# Patient Record
Sex: FEMALE | Race: WHITE | NOT HISPANIC OR LATINO | Employment: FULL TIME | URBAN - METROPOLITAN AREA
[De-identification: names, ages, dates, MRNs, and addresses within clinical notes are randomized per-mention and may not be internally consistent; named-entity substitution may affect disease eponyms.]

---

## 2020-08-30 ENCOUNTER — HOSPITAL ENCOUNTER (INPATIENT)
Facility: HOSPITAL | Age: 30
LOS: 3 days | Discharge: HOME | End: 2020-09-02
Attending: EMERGENCY MEDICINE | Admitting: INTERNAL MEDICINE

## 2020-08-30 ENCOUNTER — APPOINTMENT (OUTPATIENT)
Dept: CT IMAGING | Facility: HOSPITAL | Age: 30
End: 2020-08-30

## 2020-08-30 ENCOUNTER — APPOINTMENT (OUTPATIENT)
Dept: GENERAL RADIOLOGY | Facility: HOSPITAL | Age: 30
End: 2020-08-30

## 2020-08-30 DIAGNOSIS — R10.84 GENERALIZED ABDOMINAL PAIN: Primary | ICD-10-CM

## 2020-08-30 DIAGNOSIS — K72.90 LIVER FAILURE WITHOUT HEPATIC COMA, UNSPECIFIED CHRONICITY (HCC): ICD-10-CM

## 2020-08-30 DIAGNOSIS — E83.119 HEMOCHROMATOSIS, UNSPECIFIED HEMOCHROMATOSIS TYPE: ICD-10-CM

## 2020-08-30 DIAGNOSIS — R18.8 OTHER ASCITES: ICD-10-CM

## 2020-08-30 PROBLEM — K74.60 CIRRHOSIS OF LIVER: Status: ACTIVE | Noted: 2020-08-30

## 2020-08-30 PROBLEM — F41.9 ANXIETY AND DEPRESSION: Status: ACTIVE | Noted: 2020-08-30

## 2020-08-30 PROBLEM — R50.9 FEVER: Status: ACTIVE | Noted: 2020-08-30

## 2020-08-30 PROBLEM — D72.829 LEUKOCYTOSIS: Status: ACTIVE | Noted: 2020-08-30

## 2020-08-30 PROBLEM — E87.6 HYPOKALEMIA: Status: ACTIVE | Noted: 2020-08-30

## 2020-08-30 PROBLEM — F32.A ANXIETY AND DEPRESSION: Status: ACTIVE | Noted: 2020-08-30

## 2020-08-30 PROBLEM — E87.1 HYPONATREMIA: Status: ACTIVE | Noted: 2020-08-30

## 2020-08-30 PROBLEM — D63.8 ANEMIA, CHRONIC DISEASE: Status: ACTIVE | Noted: 2020-08-30

## 2020-08-30 LAB
ALBUMIN SERPL-MCNC: 3.7 G/DL (ref 3.5–5.2)
ALBUMIN/GLOB SERPL: 1 G/DL
ALP SERPL-CCNC: 184 U/L (ref 39–117)
ALT SERPL W P-5'-P-CCNC: 53 U/L (ref 1–33)
ANION GAP SERPL CALCULATED.3IONS-SCNC: 15.8 MMOL/L (ref 5–15)
ANISOCYTOSIS BLD QL: ABNORMAL
APTT PPP: 36.5 SECONDS (ref 22.7–35.4)
AST SERPL-CCNC: 99 U/L (ref 1–32)
BILIRUB SERPL-MCNC: 5.8 MG/DL (ref 0–1.2)
BILIRUB UR QL STRIP: NEGATIVE
BUN SERPL-MCNC: 7 MG/DL (ref 6–20)
BUN/CREAT SERPL: 11.9 (ref 7–25)
CALCIUM SPEC-SCNC: 9.1 MG/DL (ref 8.6–10.5)
CHLORIDE SERPL-SCNC: 94 MMOL/L (ref 98–107)
CLARITY UR: CLEAR
CO2 SERPL-SCNC: 22.2 MMOL/L (ref 22–29)
COLOR UR: ABNORMAL
CREAT SERPL-MCNC: 0.59 MG/DL (ref 0.57–1)
D-LACTATE SERPL-SCNC: 2.4 MMOL/L (ref 0.5–2)
D-LACTATE SERPL-SCNC: 3 MMOL/L (ref 0.5–2)
DEPRECATED RDW RBC AUTO: 54.6 FL (ref 37–54)
ERYTHROCYTE [DISTWIDTH] IN BLOOD BY AUTOMATED COUNT: 13.1 % (ref 12.3–15.4)
ETHANOL BLD-MCNC: <10 MG/DL (ref 0–10)
ETHANOL UR QL: <0.01 %
FERRITIN SERPL-MCNC: 939 NG/ML (ref 13–150)
FOLATE SERPL-MCNC: 18.8 NG/ML (ref 4.78–24.2)
GFR SERPL CREATININE-BSD FRML MDRD: 118 ML/MIN/1.73
GLOBULIN UR ELPH-MCNC: 3.8 GM/DL
GLUCOSE SERPL-MCNC: 122 MG/DL (ref 65–99)
GLUCOSE UR STRIP-MCNC: NEGATIVE MG/DL
HBA1C MFR BLD: <4.3 % (ref 4.8–5.6)
HCG SERPL QL: NEGATIVE
HCT VFR BLD AUTO: 31.4 % (ref 34–46.6)
HGB BLD-MCNC: 11.1 G/DL (ref 12–15.9)
HGB UR QL STRIP.AUTO: NEGATIVE
HYPOCHROMIA BLD QL: ABNORMAL
INR PPP: 1.3 (ref 0.9–1.1)
IRON 24H UR-MRATE: 45 MCG/DL (ref 37–145)
IRON SATN MFR SERPL: 26 % (ref 20–50)
KETONES UR QL STRIP: NEGATIVE
LACTATE HOLD SPECIMEN: NORMAL
LDH SERPL-CCNC: 402 U/L (ref 135–214)
LEUKOCYTE ESTERASE UR QL STRIP.AUTO: NEGATIVE
LIPASE SERPL-CCNC: 51 U/L (ref 13–60)
LYMPHOCYTES # BLD MANUAL: 2.07 10*3/MM3 (ref 0.7–3.1)
LYMPHOCYTES NFR BLD MANUAL: 17.5 % (ref 19.6–45.3)
LYMPHOCYTES NFR BLD MANUAL: 6.2 % (ref 5–12)
MACROCYTES BLD QL SMEAR: ABNORMAL
MAGNESIUM SERPL-MCNC: 1.7 MG/DL (ref 1.6–2.6)
MCH RBC QN AUTO: 39.9 PG (ref 26.6–33)
MCHC RBC AUTO-ENTMCNC: 35.4 G/DL (ref 31.5–35.7)
MCV RBC AUTO: 112.9 FL (ref 79–97)
MONOCYTES # BLD AUTO: 0.73 10*3/MM3 (ref 0.1–0.9)
NEUTROPHILS # BLD AUTO: 9.04 10*3/MM3 (ref 1.7–7)
NEUTROPHILS NFR BLD MANUAL: 76.3 % (ref 42.7–76)
NITRITE UR QL STRIP: NEGATIVE
PH UR STRIP.AUTO: 5.5 [PH] (ref 5–8)
PLAT MORPH BLD: NORMAL
PLATELET # BLD AUTO: 317 10*3/MM3 (ref 140–450)
PMV BLD AUTO: 8.9 FL (ref 6–12)
POTASSIUM SERPL-SCNC: 3.1 MMOL/L (ref 3.5–5.2)
PROCALCITONIN SERPL-MCNC: 0.46 NG/ML (ref 0–0.25)
PROT SERPL-MCNC: 7.5 G/DL (ref 6–8.5)
PROT UR QL STRIP: NEGATIVE
PROTHROMBIN TIME: 16 SECONDS (ref 11.7–14.2)
RBC # BLD AUTO: 2.78 10*6/MM3 (ref 3.77–5.28)
RETICS # AUTO: 0.15 10*6/MM3 (ref 0.02–0.13)
RETICS/RBC NFR AUTO: 6.24 % (ref 0.7–1.9)
SODIUM SERPL-SCNC: 132 MMOL/L (ref 136–145)
SP GR UR STRIP: 1.01 (ref 1–1.03)
TARGETS BLD QL SMEAR: ABNORMAL
TIBC SERPL-MCNC: 170 MCG/DL (ref 298–536)
TRANSFERRIN SERPL-MCNC: 114 MG/DL (ref 200–360)
UROBILINOGEN UR QL STRIP: ABNORMAL
VIT B12 BLD-MCNC: 1404 PG/ML (ref 211–946)
WBC # BLD AUTO: 11.85 10*3/MM3 (ref 3.4–10.8)
WBC MORPH BLD: NORMAL

## 2020-08-30 PROCEDURE — 84466 ASSAY OF TRANSFERRIN: CPT | Performed by: INTERNAL MEDICINE

## 2020-08-30 PROCEDURE — 71045 X-RAY EXAM CHEST 1 VIEW: CPT

## 2020-08-30 PROCEDURE — 25010000002 IOPAMIDOL 61 % SOLUTION: Performed by: EMERGENCY MEDICINE

## 2020-08-30 PROCEDURE — 82607 VITAMIN B-12: CPT | Performed by: INTERNAL MEDICINE

## 2020-08-30 PROCEDURE — 83690 ASSAY OF LIPASE: CPT | Performed by: EMERGENCY MEDICINE

## 2020-08-30 PROCEDURE — 80053 COMPREHEN METABOLIC PANEL: CPT | Performed by: EMERGENCY MEDICINE

## 2020-08-30 PROCEDURE — 25010000002 ONDANSETRON PER 1 MG: Performed by: EMERGENCY MEDICINE

## 2020-08-30 PROCEDURE — 81003 URINALYSIS AUTO W/O SCOPE: CPT | Performed by: EMERGENCY MEDICINE

## 2020-08-30 PROCEDURE — 93005 ELECTROCARDIOGRAM TRACING: CPT | Performed by: EMERGENCY MEDICINE

## 2020-08-30 PROCEDURE — 99285 EMERGENCY DEPT VISIT HI MDM: CPT

## 2020-08-30 PROCEDURE — 87040 BLOOD CULTURE FOR BACTERIA: CPT | Performed by: EMERGENCY MEDICINE

## 2020-08-30 PROCEDURE — 25010000002 FUROSEMIDE PER 20 MG: Performed by: INTERNAL MEDICINE

## 2020-08-30 PROCEDURE — 83735 ASSAY OF MAGNESIUM: CPT | Performed by: EMERGENCY MEDICINE

## 2020-08-30 PROCEDURE — 83615 LACTATE (LD) (LDH) ENZYME: CPT | Performed by: INTERNAL MEDICINE

## 2020-08-30 PROCEDURE — 82746 ASSAY OF FOLIC ACID SERUM: CPT | Performed by: INTERNAL MEDICINE

## 2020-08-30 PROCEDURE — 84145 PROCALCITONIN (PCT): CPT | Performed by: EMERGENCY MEDICINE

## 2020-08-30 PROCEDURE — 0W9G3ZZ DRAINAGE OF PERITONEAL CAVITY, PERCUTANEOUS APPROACH: ICD-10-PCS | Performed by: RADIOLOGY

## 2020-08-30 PROCEDURE — 85045 AUTOMATED RETICULOCYTE COUNT: CPT | Performed by: INTERNAL MEDICINE

## 2020-08-30 PROCEDURE — 85007 BL SMEAR W/DIFF WBC COUNT: CPT | Performed by: EMERGENCY MEDICINE

## 2020-08-30 PROCEDURE — 85610 PROTHROMBIN TIME: CPT | Performed by: EMERGENCY MEDICINE

## 2020-08-30 PROCEDURE — 25010000002 CEFTRIAXONE PER 250 MG: Performed by: EMERGENCY MEDICINE

## 2020-08-30 PROCEDURE — U0004 COV-19 TEST NON-CDC HGH THRU: HCPCS | Performed by: EMERGENCY MEDICINE

## 2020-08-30 PROCEDURE — 83540 ASSAY OF IRON: CPT | Performed by: INTERNAL MEDICINE

## 2020-08-30 PROCEDURE — 85730 THROMBOPLASTIN TIME PARTIAL: CPT | Performed by: EMERGENCY MEDICINE

## 2020-08-30 PROCEDURE — 84703 CHORIONIC GONADOTROPIN ASSAY: CPT | Performed by: EMERGENCY MEDICINE

## 2020-08-30 PROCEDURE — 83605 ASSAY OF LACTIC ACID: CPT | Performed by: EMERGENCY MEDICINE

## 2020-08-30 PROCEDURE — 93010 ELECTROCARDIOGRAM REPORT: CPT | Performed by: INTERNAL MEDICINE

## 2020-08-30 PROCEDURE — 85025 COMPLETE CBC W/AUTO DIFF WBC: CPT | Performed by: EMERGENCY MEDICINE

## 2020-08-30 PROCEDURE — 80307 DRUG TEST PRSMV CHEM ANLYZR: CPT | Performed by: EMERGENCY MEDICINE

## 2020-08-30 PROCEDURE — 25010000002 HYDROMORPHONE 1 MG/ML SOLUTION: Performed by: INTERNAL MEDICINE

## 2020-08-30 PROCEDURE — 25010000002 FENTANYL CITRATE (PF) 100 MCG/2ML SOLUTION: Performed by: EMERGENCY MEDICINE

## 2020-08-30 PROCEDURE — 36415 COLL VENOUS BLD VENIPUNCTURE: CPT | Performed by: INTERNAL MEDICINE

## 2020-08-30 PROCEDURE — 82728 ASSAY OF FERRITIN: CPT | Performed by: INTERNAL MEDICINE

## 2020-08-30 PROCEDURE — 74177 CT ABD & PELVIS W/CONTRAST: CPT

## 2020-08-30 PROCEDURE — 83036 HEMOGLOBIN GLYCOSYLATED A1C: CPT | Performed by: INTERNAL MEDICINE

## 2020-08-30 RX ORDER — DIPHENHYDRAMINE HCL 25 MG
25 CAPSULE ORAL EVERY 6 HOURS PRN
Status: DISCONTINUED | OUTPATIENT
Start: 2020-08-30 | End: 2020-09-02 | Stop reason: HOSPADM

## 2020-08-30 RX ORDER — ONDANSETRON 2 MG/ML
4 INJECTION INTRAMUSCULAR; INTRAVENOUS EVERY 6 HOURS PRN
Status: DISCONTINUED | OUTPATIENT
Start: 2020-08-30 | End: 2020-09-02 | Stop reason: HOSPADM

## 2020-08-30 RX ORDER — CHOLECALCIFEROL (VITAMIN D3) 125 MCG
5 CAPSULE ORAL
COMMUNITY

## 2020-08-30 RX ORDER — SODIUM CHLORIDE 0.9 % (FLUSH) 0.9 %
10 SYRINGE (ML) INJECTION EVERY 12 HOURS SCHEDULED
Status: DISCONTINUED | OUTPATIENT
Start: 2020-08-30 | End: 2020-09-02 | Stop reason: HOSPADM

## 2020-08-30 RX ORDER — FUROSEMIDE 10 MG/ML
40 INJECTION INTRAMUSCULAR; INTRAVENOUS 2 TIMES DAILY
Status: DISCONTINUED | OUTPATIENT
Start: 2020-08-30 | End: 2020-08-31

## 2020-08-30 RX ORDER — FENTANYL CITRATE 50 UG/ML
50 INJECTION, SOLUTION INTRAMUSCULAR; INTRAVENOUS ONCE
Status: COMPLETED | OUTPATIENT
Start: 2020-08-30 | End: 2020-08-30

## 2020-08-30 RX ORDER — FUROSEMIDE 40 MG/1
40 TABLET ORAL DAILY
COMMUNITY

## 2020-08-30 RX ORDER — SPIRONOLACTONE 100 MG/1
100 TABLET, FILM COATED ORAL DAILY
COMMUNITY

## 2020-08-30 RX ORDER — HYDROCODONE BITARTRATE AND ACETAMINOPHEN 5; 325 MG/1; MG/1
1 TABLET ORAL EVERY 4 HOURS PRN
Status: DISCONTINUED | OUTPATIENT
Start: 2020-08-30 | End: 2020-09-02 | Stop reason: HOSPADM

## 2020-08-30 RX ORDER — BUSPIRONE HYDROCHLORIDE 5 MG/1
5 TABLET ORAL 3 TIMES DAILY
COMMUNITY

## 2020-08-30 RX ORDER — PANTOPRAZOLE SODIUM 40 MG/1
40 TABLET, DELAYED RELEASE ORAL DAILY
COMMUNITY

## 2020-08-30 RX ORDER — CYCLOBENZAPRINE HCL 10 MG
10 TABLET ORAL 3 TIMES DAILY PRN
COMMUNITY

## 2020-08-30 RX ORDER — CHOLECALCIFEROL (VITAMIN D3) 125 MCG
5 CAPSULE ORAL NIGHTLY PRN
Status: DISCONTINUED | OUTPATIENT
Start: 2020-08-30 | End: 2020-09-02 | Stop reason: HOSPADM

## 2020-08-30 RX ORDER — PANTOPRAZOLE SODIUM 40 MG/1
40 TABLET, DELAYED RELEASE ORAL EVERY MORNING
Status: DISCONTINUED | OUTPATIENT
Start: 2020-08-31 | End: 2020-09-02 | Stop reason: HOSPADM

## 2020-08-30 RX ORDER — BUSPIRONE HYDROCHLORIDE 5 MG/1
5 TABLET ORAL 3 TIMES DAILY
Status: DISCONTINUED | OUTPATIENT
Start: 2020-08-30 | End: 2020-09-02 | Stop reason: HOSPADM

## 2020-08-30 RX ORDER — NITROGLYCERIN 0.4 MG/1
0.4 TABLET SUBLINGUAL
Status: DISCONTINUED | OUTPATIENT
Start: 2020-08-30 | End: 2020-09-02 | Stop reason: HOSPADM

## 2020-08-30 RX ORDER — PAROXETINE 10 MG/1
10 TABLET, FILM COATED ORAL EVERY MORNING
COMMUNITY

## 2020-08-30 RX ORDER — PAROXETINE 10 MG/1
10 TABLET, FILM COATED ORAL EVERY MORNING
Status: DISCONTINUED | OUTPATIENT
Start: 2020-08-31 | End: 2020-09-02 | Stop reason: HOSPADM

## 2020-08-30 RX ORDER — CEFTRIAXONE SODIUM 1 G/50ML
1 INJECTION, SOLUTION INTRAVENOUS ONCE
Status: COMPLETED | OUTPATIENT
Start: 2020-08-30 | End: 2020-08-30

## 2020-08-30 RX ORDER — FENTANYL CITRATE 50 UG/ML
50 INJECTION, SOLUTION INTRAMUSCULAR; INTRAVENOUS
Status: DISCONTINUED | OUTPATIENT
Start: 2020-08-30 | End: 2020-08-30

## 2020-08-30 RX ORDER — POTASSIUM CHLORIDE 750 MG/1
20 CAPSULE, EXTENDED RELEASE ORAL 2 TIMES DAILY WITH MEALS
Status: DISCONTINUED | OUTPATIENT
Start: 2020-08-30 | End: 2020-09-01

## 2020-08-30 RX ORDER — FOLIC ACID 1 MG/1
1 TABLET ORAL DAILY
Status: DISCONTINUED | OUTPATIENT
Start: 2020-08-31 | End: 2020-09-02 | Stop reason: HOSPADM

## 2020-08-30 RX ORDER — ONDANSETRON 4 MG/1
4 TABLET, FILM COATED ORAL EVERY 6 HOURS PRN
Status: DISCONTINUED | OUTPATIENT
Start: 2020-08-30 | End: 2020-09-02 | Stop reason: HOSPADM

## 2020-08-30 RX ORDER — FOLIC ACID 1 MG/1
1 TABLET ORAL DAILY
COMMUNITY

## 2020-08-30 RX ORDER — ONDANSETRON 2 MG/ML
4 INJECTION INTRAMUSCULAR; INTRAVENOUS ONCE
Status: COMPLETED | OUTPATIENT
Start: 2020-08-30 | End: 2020-08-30

## 2020-08-30 RX ORDER — PENTOXIFYLLINE 400 MG/1
400 TABLET, EXTENDED RELEASE ORAL
COMMUNITY

## 2020-08-30 RX ORDER — SODIUM CHLORIDE 0.9 % (FLUSH) 0.9 %
10 SYRINGE (ML) INJECTION AS NEEDED
Status: DISCONTINUED | OUTPATIENT
Start: 2020-08-30 | End: 2020-09-02 | Stop reason: HOSPADM

## 2020-08-30 RX ORDER — CYCLOBENZAPRINE HCL 10 MG
10 TABLET ORAL 3 TIMES DAILY PRN
Status: DISCONTINUED | OUTPATIENT
Start: 2020-08-30 | End: 2020-09-02 | Stop reason: HOSPADM

## 2020-08-30 RX ORDER — BISACODYL 5 MG/1
5 TABLET, DELAYED RELEASE ORAL DAILY PRN
Status: DISCONTINUED | OUTPATIENT
Start: 2020-08-30 | End: 2020-09-02 | Stop reason: HOSPADM

## 2020-08-30 RX ORDER — DIPHENHYDRAMINE HCL 25 MG
25 CAPSULE ORAL EVERY 6 HOURS PRN
COMMUNITY

## 2020-08-30 RX ORDER — MULTIPLE VITAMINS W/ MINERALS TAB 9MG-400MCG
1 TAB ORAL DAILY
COMMUNITY

## 2020-08-30 RX ORDER — POTASSIUM CHLORIDE 1.5 G/1.77G
40 POWDER, FOR SOLUTION ORAL AS NEEDED
Status: DISCONTINUED | OUTPATIENT
Start: 2020-08-30 | End: 2020-09-02 | Stop reason: HOSPADM

## 2020-08-30 RX ORDER — PENTOXIFYLLINE 400 MG/1
400 TABLET, EXTENDED RELEASE ORAL
Status: DISCONTINUED | OUTPATIENT
Start: 2020-08-30 | End: 2020-09-02 | Stop reason: HOSPADM

## 2020-08-30 RX ORDER — CEFTRIAXONE SODIUM 1 G/50ML
1 INJECTION, SOLUTION INTRAVENOUS EVERY 24 HOURS
Status: DISCONTINUED | OUTPATIENT
Start: 2020-08-31 | End: 2020-09-01

## 2020-08-30 RX ORDER — POTASSIUM CHLORIDE 750 MG/1
40 CAPSULE, EXTENDED RELEASE ORAL AS NEEDED
Status: DISCONTINUED | OUTPATIENT
Start: 2020-08-30 | End: 2020-09-02 | Stop reason: HOSPADM

## 2020-08-30 RX ORDER — POTASSIUM CHLORIDE 750 MG/1
40 CAPSULE, EXTENDED RELEASE ORAL ONCE
Status: COMPLETED | OUTPATIENT
Start: 2020-08-30 | End: 2020-08-30

## 2020-08-30 RX ADMIN — HYDROCODONE BITARTRATE AND ACETAMINOPHEN 1 TABLET: 5; 325 TABLET ORAL at 23:31

## 2020-08-30 RX ADMIN — HYDROCODONE BITARTRATE AND ACETAMINOPHEN 1 TABLET: 5; 325 TABLET ORAL at 19:32

## 2020-08-30 RX ADMIN — CEFTRIAXONE SODIUM 1 G: 1 INJECTION, SOLUTION INTRAVENOUS at 15:08

## 2020-08-30 RX ADMIN — BUSPIRONE HYDROCHLORIDE 5 MG: 5 TABLET ORAL at 20:51

## 2020-08-30 RX ADMIN — PENTOXIFYLLINE 400 MG: 400 TABLET, EXTENDED RELEASE ORAL at 18:58

## 2020-08-30 RX ADMIN — SODIUM CHLORIDE, PRESERVATIVE FREE 10 ML: 5 INJECTION INTRAVENOUS at 20:51

## 2020-08-30 RX ADMIN — IOPAMIDOL 85 ML: 612 INJECTION, SOLUTION INTRAVENOUS at 13:40

## 2020-08-30 RX ADMIN — HYDROMORPHONE HYDROCHLORIDE 1 MG: 1 INJECTION, SOLUTION INTRAMUSCULAR; INTRAVENOUS; SUBCUTANEOUS at 21:33

## 2020-08-30 RX ADMIN — POTASSIUM CHLORIDE 40 MEQ: 10 CAPSULE, COATED, EXTENDED RELEASE ORAL at 14:53

## 2020-08-30 RX ADMIN — POTASSIUM CHLORIDE 20 MEQ: 10 CAPSULE, COATED, EXTENDED RELEASE ORAL at 18:29

## 2020-08-30 RX ADMIN — FENTANYL CITRATE 50 MCG: 50 INJECTION, SOLUTION INTRAMUSCULAR; INTRAVENOUS at 15:37

## 2020-08-30 RX ADMIN — HYDROMORPHONE HYDROCHLORIDE 1 MG: 1 INJECTION, SOLUTION INTRAMUSCULAR; INTRAVENOUS; SUBCUTANEOUS at 17:33

## 2020-08-30 RX ADMIN — FUROSEMIDE 40 MG: 10 INJECTION, SOLUTION INTRAMUSCULAR; INTRAVENOUS at 18:29

## 2020-08-30 RX ADMIN — ONDANSETRON 4 MG: 2 INJECTION INTRAMUSCULAR; INTRAVENOUS at 15:36

## 2020-08-31 ENCOUNTER — APPOINTMENT (OUTPATIENT)
Dept: ULTRASOUND IMAGING | Facility: HOSPITAL | Age: 30
End: 2020-08-31

## 2020-08-31 LAB
ALBUMIN FLD-MCNC: 0.6 G/DL
ALPHA-FETOPROTEIN: 3.63 NG/ML (ref 0–8.3)
AMYLASE FLD-CCNC: 12 U/L
APPEARANCE FLD: ABNORMAL
COLOR FLD: YELLOW
GLUCOSE FLD-MCNC: 138 MG/DL
INR PPP: 1.38 (ref 0.9–1.1)
LDH FLD-CCNC: 92 U/L
LYMPHOCYTES NFR FLD MANUAL: 38 %
METHOD: ABNORMAL
MONOCYTES NFR FLD: 12 %
MONOS+MACROS NFR FLD: 27 %
NEUTROPHILS NFR FLD MANUAL: 23 %
NUC CELL # FLD: 143 /MM3
PROT FLD-MCNC: 1.2 G/DL
PROTHROMBIN TIME: 16.8 SECONDS (ref 11.7–14.2)
RBC # FLD AUTO: 255 /MM3
REF LAB TEST METHOD: NORMAL
SARS-COV-2 RNA RESP QL NAA+PROBE: NOT DETECTED

## 2020-08-31 PROCEDURE — 82247 BILIRUBIN TOTAL: CPT | Performed by: INTERNAL MEDICINE

## 2020-08-31 PROCEDURE — 25010000003 LIDOCAINE 1 % SOLUTION: Performed by: RADIOLOGY

## 2020-08-31 PROCEDURE — 87015 SPECIMEN INFECT AGNT CONCNTJ: CPT | Performed by: INTERNAL MEDICINE

## 2020-08-31 PROCEDURE — 88305 TISSUE EXAM BY PATHOLOGIST: CPT | Performed by: INTERNAL MEDICINE

## 2020-08-31 PROCEDURE — 25010000002 ONDANSETRON PER 1 MG: Performed by: INTERNAL MEDICINE

## 2020-08-31 PROCEDURE — 84157 ASSAY OF PROTEIN OTHER: CPT | Performed by: INTERNAL MEDICINE

## 2020-08-31 PROCEDURE — 82105 ALPHA-FETOPROTEIN SERUM: CPT | Performed by: INTERNAL MEDICINE

## 2020-08-31 PROCEDURE — 76942 ECHO GUIDE FOR BIOPSY: CPT

## 2020-08-31 PROCEDURE — 82945 GLUCOSE OTHER FLUID: CPT | Performed by: INTERNAL MEDICINE

## 2020-08-31 PROCEDURE — 87070 CULTURE OTHR SPECIMN AEROBIC: CPT | Performed by: INTERNAL MEDICINE

## 2020-08-31 PROCEDURE — 25010000002 CEFTRIAXONE PER 250 MG: Performed by: INTERNAL MEDICINE

## 2020-08-31 PROCEDURE — 25010000002 FUROSEMIDE PER 20 MG: Performed by: INTERNAL MEDICINE

## 2020-08-31 PROCEDURE — 63710000001 DIPHENHYDRAMINE PER 50 MG: Performed by: INTERNAL MEDICINE

## 2020-08-31 PROCEDURE — 87075 CULTR BACTERIA EXCEPT BLOOD: CPT | Performed by: INTERNAL MEDICINE

## 2020-08-31 PROCEDURE — 87205 SMEAR GRAM STAIN: CPT | Performed by: INTERNAL MEDICINE

## 2020-08-31 PROCEDURE — 82150 ASSAY OF AMYLASE: CPT | Performed by: INTERNAL MEDICINE

## 2020-08-31 PROCEDURE — 83615 LACTATE (LD) (LDH) ENZYME: CPT | Performed by: INTERNAL MEDICINE

## 2020-08-31 PROCEDURE — 82042 OTHER SOURCE ALBUMIN QUAN EA: CPT | Performed by: INTERNAL MEDICINE

## 2020-08-31 PROCEDURE — 99223 1ST HOSP IP/OBS HIGH 75: CPT | Performed by: INTERNAL MEDICINE

## 2020-08-31 PROCEDURE — 85610 PROTHROMBIN TIME: CPT | Performed by: INTERNAL MEDICINE

## 2020-08-31 PROCEDURE — 25010000002 HYDROMORPHONE 1 MG/ML SOLUTION: Performed by: INTERNAL MEDICINE

## 2020-08-31 PROCEDURE — 89051 BODY FLUID CELL COUNT: CPT | Performed by: INTERNAL MEDICINE

## 2020-08-31 PROCEDURE — 88112 CYTOPATH CELL ENHANCE TECH: CPT | Performed by: INTERNAL MEDICINE

## 2020-08-31 RX ORDER — LIDOCAINE HYDROCHLORIDE 10 MG/ML
10 INJECTION, SOLUTION INFILTRATION; PERINEURAL ONCE
Status: COMPLETED | OUTPATIENT
Start: 2020-08-31 | End: 2020-08-31

## 2020-08-31 RX ORDER — SPIRONOLACTONE 100 MG/1
100 TABLET, FILM COATED ORAL DAILY
Status: DISCONTINUED | OUTPATIENT
Start: 2020-08-31 | End: 2020-09-02 | Stop reason: HOSPADM

## 2020-08-31 RX ORDER — FUROSEMIDE 40 MG/1
40 TABLET ORAL DAILY
Status: DISCONTINUED | OUTPATIENT
Start: 2020-08-31 | End: 2020-09-02 | Stop reason: HOSPADM

## 2020-08-31 RX ORDER — SENNA PLUS 8.6 MG/1
2 TABLET ORAL DAILY
Status: DISCONTINUED | OUTPATIENT
Start: 2020-08-31 | End: 2020-09-02 | Stop reason: HOSPADM

## 2020-08-31 RX ADMIN — HYDROCODONE BITARTRATE AND ACETAMINOPHEN 1 TABLET: 5; 325 TABLET ORAL at 08:07

## 2020-08-31 RX ADMIN — HYDROMORPHONE HYDROCHLORIDE 1 MG: 1 INJECTION, SOLUTION INTRAMUSCULAR; INTRAVENOUS; SUBCUTANEOUS at 06:13

## 2020-08-31 RX ADMIN — HYDROMORPHONE HYDROCHLORIDE 1 MG: 1 INJECTION, SOLUTION INTRAMUSCULAR; INTRAVENOUS; SUBCUTANEOUS at 14:30

## 2020-08-31 RX ADMIN — POTASSIUM CHLORIDE 20 MEQ: 10 CAPSULE, COATED, EXTENDED RELEASE ORAL at 08:07

## 2020-08-31 RX ADMIN — HYDROMORPHONE HYDROCHLORIDE 1 MG: 1 INJECTION, SOLUTION INTRAMUSCULAR; INTRAVENOUS; SUBCUTANEOUS at 20:08

## 2020-08-31 RX ADMIN — SODIUM CHLORIDE, PRESERVATIVE FREE 10 ML: 5 INJECTION INTRAVENOUS at 08:08

## 2020-08-31 RX ADMIN — ONDANSETRON 4 MG: 2 INJECTION INTRAMUSCULAR; INTRAVENOUS at 14:30

## 2020-08-31 RX ADMIN — SODIUM CHLORIDE, PRESERVATIVE FREE 10 ML: 5 INJECTION INTRAVENOUS at 20:08

## 2020-08-31 RX ADMIN — HYDROCODONE BITARTRATE AND ACETAMINOPHEN 1 TABLET: 5; 325 TABLET ORAL at 22:59

## 2020-08-31 RX ADMIN — FUROSEMIDE 40 MG: 40 TABLET ORAL at 17:18

## 2020-08-31 RX ADMIN — SPIRONOLACTONE 100 MG: 100 TABLET, FILM COATED ORAL at 17:18

## 2020-08-31 RX ADMIN — ONDANSETRON 4 MG: 2 INJECTION INTRAMUSCULAR; INTRAVENOUS at 20:08

## 2020-08-31 RX ADMIN — BUSPIRONE HYDROCHLORIDE 5 MG: 5 TABLET ORAL at 20:08

## 2020-08-31 RX ADMIN — SENNOSIDES 2 TABLET: 8.6 TABLET, FILM COATED ORAL at 17:18

## 2020-08-31 RX ADMIN — HYDROCODONE BITARTRATE AND ACETAMINOPHEN 1 TABLET: 5; 325 TABLET ORAL at 12:06

## 2020-08-31 RX ADMIN — PENTOXIFYLLINE 400 MG: 400 TABLET, EXTENDED RELEASE ORAL at 12:04

## 2020-08-31 RX ADMIN — ONDANSETRON 4 MG: 2 INJECTION INTRAMUSCULAR; INTRAVENOUS at 08:15

## 2020-08-31 RX ADMIN — DIPHENHYDRAMINE HYDROCHLORIDE 25 MG: 25 CAPSULE ORAL at 05:53

## 2020-08-31 RX ADMIN — HYDROMORPHONE HYDROCHLORIDE 1 MG: 1 INJECTION, SOLUTION INTRAMUSCULAR; INTRAVENOUS; SUBCUTANEOUS at 10:36

## 2020-08-31 RX ADMIN — POTASSIUM CHLORIDE 20 MEQ: 10 CAPSULE, COATED, EXTENDED RELEASE ORAL at 17:18

## 2020-08-31 RX ADMIN — PENTOXIFYLLINE 400 MG: 400 TABLET, EXTENDED RELEASE ORAL at 17:17

## 2020-08-31 RX ADMIN — DIPHENHYDRAMINE HYDROCHLORIDE 25 MG: 25 CAPSULE ORAL at 17:22

## 2020-08-31 RX ADMIN — BUSPIRONE HYDROCHLORIDE 5 MG: 5 TABLET ORAL at 17:22

## 2020-08-31 RX ADMIN — LIDOCAINE HYDROCHLORIDE 7 ML: 10 INJECTION, SOLUTION INFILTRATION; PERINEURAL at 15:12

## 2020-08-31 RX ADMIN — HYDROCODONE BITARTRATE AND ACETAMINOPHEN 1 TABLET: 5; 325 TABLET ORAL at 03:33

## 2020-08-31 RX ADMIN — FUROSEMIDE 40 MG: 10 INJECTION, SOLUTION INTRAMUSCULAR; INTRAVENOUS at 06:13

## 2020-08-31 RX ADMIN — HYDROMORPHONE HYDROCHLORIDE 1 MG: 1 INJECTION, SOLUTION INTRAMUSCULAR; INTRAVENOUS; SUBCUTANEOUS at 02:11

## 2020-08-31 RX ADMIN — PAROXETINE 10 MG: 10 TABLET, FILM COATED ORAL at 06:13

## 2020-08-31 RX ADMIN — PANTOPRAZOLE SODIUM 40 MG: 40 TABLET, DELAYED RELEASE ORAL at 06:13

## 2020-08-31 RX ADMIN — HYDROCODONE BITARTRATE AND ACETAMINOPHEN 1 TABLET: 5; 325 TABLET ORAL at 17:18

## 2020-08-31 RX ADMIN — CEFTRIAXONE SODIUM 1 G: 1 INJECTION, SOLUTION INTRAVENOUS at 17:17

## 2020-09-01 ENCOUNTER — APPOINTMENT (OUTPATIENT)
Dept: MRI IMAGING | Facility: HOSPITAL | Age: 30
End: 2020-09-01

## 2020-09-01 PROBLEM — R10.84 GENERALIZED ABDOMINAL PAIN: Status: RESOLVED | Noted: 2020-08-30 | Resolved: 2020-09-01

## 2020-09-01 PROBLEM — D72.829 LEUKOCYTOSIS: Status: RESOLVED | Noted: 2020-08-30 | Resolved: 2020-09-01

## 2020-09-01 PROBLEM — R50.9 FEVER: Status: RESOLVED | Noted: 2020-08-30 | Resolved: 2020-09-01

## 2020-09-01 LAB
ALBUMIN SERPL-MCNC: 3.3 G/DL (ref 3.5–5.2)
ALBUMIN/GLOB SERPL: 1 G/DL
ALP SERPL-CCNC: 166 U/L (ref 39–117)
ALT SERPL W P-5'-P-CCNC: 46 U/L (ref 1–33)
ANION GAP SERPL CALCULATED.3IONS-SCNC: 10.5 MMOL/L (ref 5–15)
AST SERPL-CCNC: 79 U/L (ref 1–32)
BASOPHILS # BLD AUTO: 0.08 10*3/MM3 (ref 0–0.2)
BASOPHILS NFR BLD AUTO: 0.8 % (ref 0–1.5)
BILIRUB SERPL-MCNC: 3.9 MG/DL (ref 0–1.2)
BUN SERPL-MCNC: 4 MG/DL (ref 6–20)
BUN/CREAT SERPL: 9.3 (ref 7–25)
CALCIUM SPEC-SCNC: 8.5 MG/DL (ref 8.6–10.5)
CHLORIDE SERPL-SCNC: 97 MMOL/L (ref 98–107)
CO2 SERPL-SCNC: 26.5 MMOL/L (ref 22–29)
CREAT SERPL-MCNC: 0.43 MG/DL (ref 0.57–1)
DEPRECATED RDW RBC AUTO: 50.2 FL (ref 37–54)
EOSINOPHIL # BLD AUTO: 0.26 10*3/MM3 (ref 0–0.4)
EOSINOPHIL NFR BLD AUTO: 2.5 % (ref 0.3–6.2)
ERYTHROCYTE [DISTWIDTH] IN BLOOD BY AUTOMATED COUNT: 12.8 % (ref 12.3–15.4)
GFR SERPL CREATININE-BSD FRML MDRD: >150 ML/MIN/1.73
GLOBULIN UR ELPH-MCNC: 3.4 GM/DL
GLUCOSE SERPL-MCNC: 121 MG/DL (ref 65–99)
HCT VFR BLD AUTO: 28.3 % (ref 34–46.6)
HGB BLD-MCNC: 10.5 G/DL (ref 12–15.9)
LDH SERPL-CCNC: 449 U/L (ref 135–214)
LYMPHOCYTES # BLD AUTO: 2.08 10*3/MM3 (ref 0.7–3.1)
LYMPHOCYTES NFR BLD AUTO: 19.8 % (ref 19.6–45.3)
MCH RBC QN AUTO: 40.7 PG (ref 26.6–33)
MCHC RBC AUTO-ENTMCNC: 37.1 G/DL (ref 31.5–35.7)
MCV RBC AUTO: 109.7 FL (ref 79–97)
MONOCYTES # BLD AUTO: 1.1 10*3/MM3 (ref 0.1–0.9)
MONOCYTES NFR BLD AUTO: 10.5 % (ref 5–12)
NEUTROPHILS NFR BLD AUTO: 6.92 10*3/MM3 (ref 1.7–7)
NEUTROPHILS NFR BLD AUTO: 65.9 % (ref 42.7–76)
PLATELET # BLD AUTO: 273 10*3/MM3 (ref 140–450)
PMV BLD AUTO: 8.8 FL (ref 6–12)
POTASSIUM SERPL-SCNC: 3.3 MMOL/L (ref 3.5–5.2)
PROT SERPL-MCNC: 6.7 G/DL (ref 6–8.5)
RBC # BLD AUTO: 2.58 10*6/MM3 (ref 3.77–5.28)
REF LAB TEST METHOD: NORMAL
SODIUM SERPL-SCNC: 134 MMOL/L (ref 136–145)
WBC # BLD AUTO: 10.49 10*3/MM3 (ref 3.4–10.8)

## 2020-09-01 PROCEDURE — 25010000002 HYDROMORPHONE 1 MG/ML SOLUTION: Performed by: INTERNAL MEDICINE

## 2020-09-01 PROCEDURE — 74183 MRI ABD W/O CNTR FLWD CNTR: CPT

## 2020-09-01 PROCEDURE — 25010000002 ONDANSETRON PER 1 MG: Performed by: INTERNAL MEDICINE

## 2020-09-01 PROCEDURE — 80053 COMPREHEN METABOLIC PANEL: CPT | Performed by: INTERNAL MEDICINE

## 2020-09-01 PROCEDURE — 85025 COMPLETE CBC W/AUTO DIFF WBC: CPT | Performed by: INTERNAL MEDICINE

## 2020-09-01 PROCEDURE — A9575 INJ GADOTERATE MEGLUMI 0.1ML: HCPCS | Performed by: INTERNAL MEDICINE

## 2020-09-01 PROCEDURE — 63710000001 DIPHENHYDRAMINE PER 50 MG: Performed by: INTERNAL MEDICINE

## 2020-09-01 PROCEDURE — 83615 LACTATE (LD) (LDH) ENZYME: CPT | Performed by: INTERNAL MEDICINE

## 2020-09-01 PROCEDURE — P9047 ALBUMIN (HUMAN), 25%, 50ML: HCPCS | Performed by: INTERNAL MEDICINE

## 2020-09-01 PROCEDURE — 25010000002 GADOTERATE MEGLUMINE 10 MMOL/20ML SOLUTION: Performed by: INTERNAL MEDICINE

## 2020-09-01 PROCEDURE — 25010000002 ALBUMIN HUMAN 25% PER 50 ML: Performed by: INTERNAL MEDICINE

## 2020-09-01 RX ORDER — POTASSIUM CHLORIDE 750 MG/1
20 CAPSULE, EXTENDED RELEASE ORAL
Status: DISCONTINUED | OUTPATIENT
Start: 2020-09-01 | End: 2020-09-02 | Stop reason: HOSPADM

## 2020-09-01 RX ORDER — GADOTERATE MEGLUMINE 376.9 MG/ML
18 INJECTION INTRAVENOUS
Status: COMPLETED | OUTPATIENT
Start: 2020-09-01 | End: 2020-09-01

## 2020-09-01 RX ORDER — ALBUMIN (HUMAN) 12.5 G/50ML
50 SOLUTION INTRAVENOUS ONCE
Status: COMPLETED | OUTPATIENT
Start: 2020-09-01 | End: 2020-09-01

## 2020-09-01 RX ORDER — POTASSIUM CHLORIDE 750 MG/1
20 CAPSULE, EXTENDED RELEASE ORAL
Qty: 90 CAPSULE | Refills: 3 | Status: SHIPPED | OUTPATIENT
Start: 2020-09-01

## 2020-09-01 RX ADMIN — HYDROCODONE BITARTRATE AND ACETAMINOPHEN 1 TABLET: 5; 325 TABLET ORAL at 21:31

## 2020-09-01 RX ADMIN — PENTOXIFYLLINE 400 MG: 400 TABLET, EXTENDED RELEASE ORAL at 13:31

## 2020-09-01 RX ADMIN — BUSPIRONE HYDROCHLORIDE 5 MG: 5 TABLET ORAL at 20:46

## 2020-09-01 RX ADMIN — HYDROCODONE BITARTRATE AND ACETAMINOPHEN 1 TABLET: 5; 325 TABLET ORAL at 12:20

## 2020-09-01 RX ADMIN — HYDROMORPHONE HYDROCHLORIDE 1 MG: 1 INJECTION, SOLUTION INTRAMUSCULAR; INTRAVENOUS; SUBCUTANEOUS at 05:11

## 2020-09-01 RX ADMIN — SODIUM CHLORIDE, PRESERVATIVE FREE 10 ML: 5 INJECTION INTRAVENOUS at 08:20

## 2020-09-01 RX ADMIN — ONDANSETRON 4 MG: 2 INJECTION INTRAMUSCULAR; INTRAVENOUS at 18:10

## 2020-09-01 RX ADMIN — PENTOXIFYLLINE 400 MG: 400 TABLET, EXTENDED RELEASE ORAL at 08:20

## 2020-09-01 RX ADMIN — ONDANSETRON 4 MG: 2 INJECTION INTRAMUSCULAR; INTRAVENOUS at 05:11

## 2020-09-01 RX ADMIN — PANTOPRAZOLE SODIUM 40 MG: 40 TABLET, DELAYED RELEASE ORAL at 08:19

## 2020-09-01 RX ADMIN — POTASSIUM CHLORIDE 20 MEQ: 10 CAPSULE, COATED, EXTENDED RELEASE ORAL at 08:19

## 2020-09-01 RX ADMIN — SENNOSIDES 2 TABLET: 8.6 TABLET, FILM COATED ORAL at 08:20

## 2020-09-01 RX ADMIN — ALBUMIN HUMAN 50 G: 0.25 SOLUTION INTRAVENOUS at 20:46

## 2020-09-01 RX ADMIN — SPIRONOLACTONE 100 MG: 100 TABLET, FILM COATED ORAL at 08:19

## 2020-09-01 RX ADMIN — FUROSEMIDE 40 MG: 40 TABLET ORAL at 08:19

## 2020-09-01 RX ADMIN — PAROXETINE 10 MG: 10 TABLET, FILM COATED ORAL at 08:19

## 2020-09-01 RX ADMIN — HYDROCODONE BITARTRATE AND ACETAMINOPHEN 1 TABLET: 5; 325 TABLET ORAL at 08:20

## 2020-09-01 RX ADMIN — PENTOXIFYLLINE 400 MG: 400 TABLET, EXTENDED RELEASE ORAL at 17:18

## 2020-09-01 RX ADMIN — HYDROMORPHONE HYDROCHLORIDE 1 MG: 1 INJECTION, SOLUTION INTRAMUSCULAR; INTRAVENOUS; SUBCUTANEOUS at 00:38

## 2020-09-01 RX ADMIN — DIPHENHYDRAMINE HYDROCHLORIDE 25 MG: 25 CAPSULE ORAL at 00:38

## 2020-09-01 RX ADMIN — HYDROMORPHONE HYDROCHLORIDE 1 MG: 1 INJECTION, SOLUTION INTRAMUSCULAR; INTRAVENOUS; SUBCUTANEOUS at 09:10

## 2020-09-01 RX ADMIN — HYDROCODONE BITARTRATE AND ACETAMINOPHEN 1 TABLET: 5; 325 TABLET ORAL at 17:18

## 2020-09-01 RX ADMIN — BUSPIRONE HYDROCHLORIDE 5 MG: 5 TABLET ORAL at 17:18

## 2020-09-01 RX ADMIN — BUSPIRONE HYDROCHLORIDE 5 MG: 5 TABLET ORAL at 08:20

## 2020-09-01 RX ADMIN — Medication 5 MG: at 20:46

## 2020-09-01 RX ADMIN — GADOTERATE MEGLUMINE 18 ML: 376.9 INJECTION, SOLUTION INTRAVENOUS at 07:45

## 2020-09-01 RX ADMIN — ONDANSETRON 4 MG: 2 INJECTION INTRAMUSCULAR; INTRAVENOUS at 12:20

## 2020-09-01 RX ADMIN — FOLIC ACID 1 MG: 1 TABLET ORAL at 08:19

## 2020-09-01 RX ADMIN — POTASSIUM CHLORIDE 40 MEQ: 10 CAPSULE, COATED, EXTENDED RELEASE ORAL at 17:18

## 2020-09-01 RX ADMIN — POTASSIUM CHLORIDE 40 MEQ: 10 CAPSULE, COATED, EXTENDED RELEASE ORAL at 13:56

## 2020-09-01 NOTE — DISCHARGE SUMMARY
Patient Name: Nayeli Bose  : 1990  MRN: [id number]    Date of Admission: 2020  Date of Discharge:  2020  Primary Care Physician: Shala Garcia, APRN      Discharge Diagnoses     Active Hospital Problems    Diagnosis  POA   • **Ascites [R18.8]  Yes   • Cirrhosis of liver (CMS/HCC) [K74.60]  Yes   • Hypokalemia [E87.6]  Yes   • Hyponatremia [E87.1]  Yes   • Anemia, chronic disease [D63.8]  Yes   • Anxiety and depression [F41.9, F32.9]  Yes      Resolved Hospital Problems    Diagnosis Date Resolved POA   • Generalized abdominal pain [R10.84] 2020 Yes   • Fever [R50.9] 2020 Yes   • Leukocytosis [D72.829] 2020 Yes        Hospital Course     Brief admission history and physical.  These refer to the H&P for full details.  A 30 years old white female recently diagnosed with liver cirrhosis and portal hypertension because of hemochromatosis and possibly alcohol.  She was recently discharged from [facility].  She presented to the hospital because of increasing abdominal distention and abdominal pain.  No nausea no vomiting no diarrhea no constipation no bleeding per rectum no melena.  There was positive exertional dyspnea.  Her physical examination was remarkable for temperature 98.4 a heart rate of 108 respiratory of 16 and blood pressure 120/75 and O2 sats of 93% on room air.  The rest of the examination is remarkable for tinge of jaundice in the sclera mild tachycardia with a grade 2 systolic murmur positive abdominal ascites and distention.  Hospital course she will ER evaluation included CMP that was normal except a blood sugar of 122 sodium 132 potassium 3.1 chloride 94 ALT 53 AST 99 alkaline phosphatase 184.  INR 1.3 PTT 36.5.  Lipase was normal.  Magnesium was normal.  hCG was negative.  Ethanol was negative.  CBC was normal except a hemoglobin of 11.1 white count 11.8.  Pro-Jase was elevated at 2.46.  UA was negative for UTI.  Lactate was elevated at 3.  CT  scan of the abdomen and pelvis with IV contrast revealed normal thickening of the colon that could suggest nonspecific colitis.  Liver markedly heterogenous with a possible neoplastic process.  Moderate abdominal pelvic ascites.  Chest x-ray revealed small left basilar atelectasis.  The patient was admitted with a diagnosis of liver cirrhosis/portal hypertension/ascites with a previous work-up at the [facility] including a liver biopsy suggestive of hemochromatosis as a cause possibly alcohol.  Therapeutic and diagnostic paracentesis was performed she was empirically started on IV diuresis and GI was consulted she was empirically started on Rocephin peritoneal fluid revealed no infection and the Rocephin was stopped GI switched the patient back to p.o. diuretics and they have cleared her by the time of discharge after paracentesis the patient felt much better with decrease of the abdominal distention and abdominal pain she was tolerating p.o. well.  She was maintained on her Lasix/Aldactone/Trental her liver function tests remained stable and actually was trending down.. Patient had an MRI of her abdomen to evaluate for the liver mass there was no discrete liver mass and mostly at dense collection of patchy infiltrates.  Cultures were negative by the time of discharge.  She was noted initially to have low-grade fever and mild leukocytosis chest x-ray and UA were negative for infection there was no evidence of spontaneous bacterial peritonitis by the fluid with negative cultures and no organisms seen by the Gram stain.  Her low-grade fever and leukocytosis has resolved by the time of discharge.  She was noted to have hyperglycemia A1c was normal.  She was noted to have hypokalemia and this was substituted prior to discharge and magnesium was normal.  Her hyponatremia has improved.  She was noticed to be anemic anemia work-up revealed anemia of chronic disease.  She has a history of anxiety and  depression continue BuSpar and Paxil.  She was asked to follow-up with the primary care physician in 1 week for monitoring of her chemistries and potassium.  Also to keep her follow-up with the GI clinic at [facility] and hematology oncology clinic at [facility] for the hemochromatosis and the possible liver mass.  Patient was hemodynamically stable and tolerating p.o. at the time of discharge.  GI has okayed the discharge and they will arrange a follow-up with them for a paracentesis soon.  Nutritionist will see the patient prior to discharge to indicate her about the the hepatic diet and low-sodium diet..    Consultants     Consult Orders (all) (From admission, onward)     Start     Ordered    08/30/20 1701  Inpatient Gastroenterology Consult  Once     Specialty:  Gastroenterology  Provider:  Clayton Rooney MD    08/30/20 1712    08/30/20 1701  Inpatient Case Management  Consult  Once     Provider:  (Not yet assigned)    08/30/20 1712    08/30/20 1429  [facility] (on-call MD unless specified) Details  Once     Specialty:  Hospitalist  Provider:  Marianne Rm MD    08/30/20 1428              Procedures     Imaging Results (All)     Procedure Component Value Units Date/Time    MRI Abdomen With & Without Contrast [[id number]] Collected:  09/01/20 0855     Updated:  09/01/20 1534    Narrative:       MRI ABDOMEN WITH/WITHOUT CONTRAST-     HISTORY: 30-year-old female with alcoholic hepatitis and cirrhosis.  Evaluate for a liver lesion.     TECHNIQUE: Routine liver MRI was performed without and with IV contrast.  Compared with CT of the abdomen from 08/30/2020.     FINDINGS: The liver is very heterogeneous in signal and is most  heterogeneous on the out of phase sequence which shows multiple  heterogeneous regions of fatty infiltration. There is a masslike region  of fatty infiltration at the medial hepatic segment which measures  approximately 8 x 5.5 cm. The enhancement  pattern is also heterogeneous.  No suspicious enhancing mass is seen. The main portal vein is patent,  but is smaller than expected. The right portal venous branches are  diminutive as well. There is recanalization of the periumbilical vein  and there are varices at the GE junction and surrounding the stomach.  The spleen measures approximately 13.5 cm in diameter. The gallbladder  is slightly contracted. There is no biliary dilatation. No abnormality  seen at the pancreas or adrenals.       Impression:       1. Very heterogeneous liver with heterogeneous fatty infiltration  throughout the liver parenchyma. There is an approximately 8 x 6 cm  masslike region of focal fatty infiltration at the medial hepatic  segment. There is no suspicious or enhancing liver mass. Please  correlate with the alpha-fetoprotein. If there is an elevation of the  alpha-fetoprotein, a follow-up liver MRI is recommended in 6 months or  sooner if indicated.  2. There is portal hypertension with varices and recanalization of the  periumbilical vein. Moderate volume of ascites. The diminutive size of  the right portal vein suggests the possibility of old thrombosis or  partial thrombosis. There is no evidence for acute thrombus within the  portal vein, splenic vein, or SMV.     This report was finalized on 9/1/2020 3:31 PM by Dr. Lu Bryson M.D.        Paracentesis [[id number]] Collected:  09/01/20 1306    Specimen:  Body Fluid Updated:  09/01/20 1309    Narrative:       ULTRASOUND-GUIDED PARACENTESIS     HISTORY: Ascites     After signed informed consent was obtained, the left lower quadrant was  prepped and draped in the usual sterile fashion. Lidocaine was used for  local anesthesia.     The paracentesis catheter was inserted into the left lower quadrant  using ultrasound guidance. 5300 mL yellow color ascites was removed.  Sample sent to the lab.     Confirmatory images were obtained.     Patient tolerated the procedure well with no  complications.       Impression:       Ultrasound-guided paracentesis as described.     This report was finalized on 9/1/2020 1:06 PM by Dr. Royce Smith M.D.       CT Abdomen Pelvis With Contrast [[id number]] Collected:  08/30/20 1416     Updated:  08/30/20 1430    Narrative:       CT ABDOMEN PELVIS W CONTRAST-     INDICATIONS: abdominal pain     TECHNIQUE: Radiation dose reduction techniques were utilized, including  automated exposure control and exposure modulation based on body size.  Enhanced  ABDOMEN AND PELVIS CT     COMPARISON: None available     FINDINGS:      Spleen is enlarged 13.3 cm.     The liver is markedly heterogeneous. Compared with the spleen, the liver  shows diffuse low-density suggesting steatosis. A heterogeneous mass  apparent centrally in the liver, 5.1 x 8.0 cm on axial image 51 shows CT  density of 30, possibility of neoplasm not excluded, multi phase  enhanced liver MRI recommended for further evaluation..     Moderate abdominal and pelvic ascites.     Assessment for inflammatory changes is limited by diffuse mesenteric  edema. Some stranding around the pancreas may relate to diffuse  mesenteric edema or could be evidence of pancreatitis.           Otherwise unremarkable appearance of the liver, spleen, adrenal glands,  pancreas, kidneys, bladder.     No bowel obstruction. Abnormal thickening of the colon wall from the  cecum to the distal transverse colon suggest nonspecific colitis.     No free intraperitoneal gas.     Scattered small mesenteric and para-aortic lymph nodes are seen that are  not significant by size criteria.     Abdominal aorta is not aneurysmal.     The lung bases show atelectasis, minimal left pleural effusion.     Degenerative changes are seen in the spine. Old left rib fractures.             Impression:          1. Abnormal thickening of the colon wall from the cecum to the distal  transverse colon suggest nonspecific colitis.  2. The liver is markedly  heterogeneous. A mass apparent centrally in the  liver, 5.1 x 8.0 cm on axial image 51 shows CT density of 30,  possibility of neoplasm not excluded, multi phase enhanced liver MRI  recommended for further evaluation.. Mild splenomegaly.     3. Moderate abdominal and pelvic ascites. Assessment for inflammatory  changes is limited by diffuse mesenteric edema. Some stranding around  the pancreas may relate to diffuse mesenteric edema or could be evidence  of pancreatitis.     Discussed by telephone with Dr. Alas at 1424, 08/30/2020.     This report was finalized on 8/30/2020 2:27 PM by Dr. Arnav Staley M.D.       XR Chest 1 View [[id number]] Collected:  08/30/20 1302     Updated:  08/30/20 1307    Narrative:       XR CHEST 1 VW-     HISTORY: Female who is 30 years-old,  pain     TECHNIQUE: Frontal view of the chest     COMPARISON: None available     FINDINGS: The size of the cardiac silhouette appears borderline could  reflect borderline heart size and/or pericardial effusion. Pulmonary  vasculature is unremarkable. Small left basilar atelectasis. No pleural  effusion, or pneumothorax. Calcified nodule projecting at the right lung  suggests old granulomatous disease. No acute osseous process.       Impression:       Small left basilar atelectasis. Borderline size of the  cardiac silhouette.       This report was finalized on 8/30/2020 1:04 PM by Dr. Arnav Staley M.D.             Pertinent Labs     Results from last 7 days   Lab Units 09/01/20  0835 08/30/20  1219   WBC 10*3/mm3 10.49 11.85*   HEMOGLOBIN g/dL 10.5* 11.1*   PLATELETS 10*3/mm3 273 317     Results from last 7 days   Lab Units 09/01/20  0835 08/30/20  1219   SODIUM mmol/L 134* 132*   POTASSIUM mmol/L 3.3* 3.1*   CHLORIDE mmol/L 97* 94*   CO2 mmol/L 26.5 22.2   BUN mg/dL 4* 7   CREATININE mg/dL 0.43* 0.59   GLUCOSE mg/dL 121* 122*   Estimated Creatinine Clearance: 213.2 mL/min (A) (by C-G formula based on SCr of 0.43 mg/dL (L)).  Results  from last 7 days   Lab Units 09/01/20  0835 08/30/20  1219   ALBUMIN g/dL 3.30* 3.70   BILIRUBIN mg/dL 3.9* 5.8*   ALK PHOS U/L 166* 184*   AST (SGOT) U/L 79* 99*   ALT (SGPT) U/L 46* 53*     Results from last 7 days   Lab Units 09/01/20  0835 08/30/20  1219   CALCIUM mg/dL 8.5* 9.1   ALBUMIN g/dL 3.30* 3.70   MAGNESIUM mg/dL  --  1.7     Results from last 7 days   Lab Units 08/30/20  1219   LIPASE U/L 51             Invalid input(s): LDLCALC  Results from last 7 days   Lab Units 08/31/20  1615 08/30/20  1507 08/30/20  1456   BLOODCX   --  No growth at 2 days No growth at 2 days   BODYFLDCX  No growth  --   --      Imaging Results (Last 24 Hours)     Procedure Component Value Units Date/Time    MRI Abdomen With & Without Contrast [[id number]] Collected:  09/01/20 0855     Updated:  09/01/20 1534    Narrative:       MRI ABDOMEN WITH/WITHOUT CONTRAST-     HISTORY: 30-year-old female with alcoholic hepatitis and cirrhosis.  Evaluate for a liver lesion.     TECHNIQUE: Routine liver MRI was performed without and with IV contrast.  Compared with CT of the abdomen from 08/30/2020.     FINDINGS: The liver is very heterogeneous in signal and is most  heterogeneous on the out of phase sequence which shows multiple  heterogeneous regions of fatty infiltration. There is a masslike region  of fatty infiltration at the medial hepatic segment which measures  approximately 8 x 5.5 cm. The enhancement pattern is also heterogeneous.  No suspicious enhancing mass is seen. The main portal vein is patent,  but is smaller than expected. The right portal venous branches are  diminutive as well. There is recanalization of the periumbilical vein  and there are varices at the GE junction and surrounding the stomach.  The spleen measures approximately 13.5 cm in diameter. The gallbladder  is slightly contracted. There is no biliary dilatation. No abnormality  seen at the pancreas or adrenals.       Impression:       1. Very heterogeneous liver  with heterogeneous fatty infiltration  throughout the liver parenchyma. There is an approximately 8 x 6 cm  masslike region of focal fatty infiltration at the medial hepatic  segment. There is no suspicious or enhancing liver mass. Please  correlate with the alpha-fetoprotein. If there is an elevation of the  alpha-fetoprotein, a follow-up liver MRI is recommended in 6 months or  sooner if indicated.  2. There is portal hypertension with varices and recanalization of the  periumbilical vein. Moderate volume of ascites. The diminutive size of  the right portal vein suggests the possibility of old thrombosis or  partial thrombosis. There is no evidence for acute thrombus within the  portal vein, splenic vein, or SMV.     This report was finalized on 9/1/2020 3:31 PM by Dr. Lu Bryson M.D.        Paracentesis [[id number]] Collected:  09/01/20 1306    Specimen:  Body Fluid Updated:  09/01/20 1309    Narrative:       ULTRASOUND-GUIDED PARACENTESIS     HISTORY: Ascites     After signed informed consent was obtained, the left lower quadrant was  prepped and draped in the usual sterile fashion. Lidocaine was used for  local anesthesia.     The paracentesis catheter was inserted into the left lower quadrant  using ultrasound guidance. 5300 mL yellow color ascites was removed.  Sample sent to the lab.     Confirmatory images were obtained.     Patient tolerated the procedure well with no complications.       Impression:       Ultrasound-guided paracentesis as described.     This report was finalized on 9/1/2020 1:06 PM by Dr. Royce Smith M.D.             Test Results Pending at Discharge      Order Current Status    Anaerobic Culture - Body Fluid, Peritoneum In process    Non-gynecologic Cytology In process    Blood Culture - Blood, Arm, Left Preliminary result    Blood Culture - Blood, Arm, Left Preliminary result    Body Fluid Culture - Body Fluid, Peritoneum Preliminary result            Discharge Exam   Physical  Exam  General.  A young female alert oriented x3 in no apparent pain distress or diaphoresis normal mood and affect  Eyes.   pupils equal round and reactive intact extraocular musculature no pallor.  No jaundice.  Oral cavity.  Most mucous membrane no throat lesions or exudates  Neck.  Supple no JVD no carotid bruit no lymphadenopathy no thyromegaly  Cardiovascular.  Regular rate and rhythm.  Grade 2 systolic murmur.    Chest.  Clear to auscultation bilaterally with no added sounds  Abdomen.  Soft lax no tenderness no organomegaly no guarding or rebound  Mild distention with small amount of ascites by shifting dullness..  Extremities.  No clubbing cyanosis or edema  CNS.  No acute focal neurological deficits  Discharge Details        Discharge Medications      New Medications      Instructions Start Date   potassium chloride 10 MEQ CR capsule  Commonly known as:  MICRO-K   20 mEq, Oral, 3 Times Daily With Meals         Continue These Medications      Instructions Start Date   busPIRone 5 MG tablet  Commonly known as:  BUSPAR   5 mg, Oral, 3 Times Daily      cyclobenzaprine 10 MG tablet  Commonly known as:  FLEXERIL   10 mg, Oral, 3 Times Daily PRN      diphenhydrAMINE 25 mg capsule  Commonly known as:  BENADRYL   25 mg, Oral, Every 6 Hours PRN      folic acid 1 MG tablet  Commonly known as:  FOLVITE   1 mg, Oral, Daily      furosemide 40 MG tablet  Commonly known as:  LASIX   40 mg, Oral, Daily      melatonin 5 MG tablet tablet   5 mg, Oral      multivitamin with minerals tablet tablet   1 tablet, Oral, Daily      pantoprazole 40 MG EC tablet  Commonly known as:  PROTONIX   40 mg, Oral, Daily      PARoxetine 10 MG tablet  Commonly known as:  PAXIL   10 mg, Oral, Every Morning      pentoxifylline 400 MG CR tablet  Commonly known as:  TRENtal   400 mg, Oral, 3 Times Daily With Meals      PROBIOTIC DAILY PO   Oral      spironolactone 100 MG tablet  Commonly known as:  ALDACTONE   100 mg, Oral, Daily      Zinc 50 MG  capsule   Oral             No Known Allergies      Discharge Disposition:  Condition: Stable    Diet:   Diet Order   Procedures   • Diet Regular; Low Sodium; 2,000 mg Na       Activity:   Activity Instructions     Activity as Tolerated            Counseling : DC any alcohol    CODE STATUS:    Code Status and Medical Interventions:   Ordered at: 08/30/20 1606     Level Of Support Discussed With:    Patient     Code Status:    CPR     Medical Interventions (Level of Support Prior to Arrest):    Full       No future appointments.  Additional Instructions for the Follow-ups that You Need to Schedule     Call MD With Problems / Concerns   As directed      Instructions: Return to emergency department will call MD if increasing abdominal pain/nausea or vomiting/shortness of breath/palpitations/chest pain    Order Comments:  Instructions: Return to emergency department will call MD if increasing abdominal pain/nausea or vomiting/shortness of breath/palpitations/chest pain          Discharge Follow-up with PCP   As directed       Currently Documented PCP:    Shala Garcia APRN    PCP Phone Number:    [phone]     Follow Up Details:  Primary MD in 1 week for liver cirrhosis/ascites         Discharge Follow-up with Specified Provider: GI at the [facility] as previously scheduled; 2 Weeks   As directed      To:  GI at the [facility] as previously scheduled    Follow Up:  2 Weeks    Follow Up Details:  Liver cirrhosis/ascites         Discharge Follow-up with Specified Provider: Hematology oncology at [facility] at the [facility] as scheduled; 1 Month   As directed      To:  Hematology oncology at [facility] at [facility] as scheduled    Follow Up:  1 Month    Follow Up Details:  Liver mass           Follow-up Information     Shala Garcia APRN .    Specialty:  Family Medicine  Why:  Primary MD in 1 week for liver  cirrhosis/ascites  Contact information:  [address] [id number] [phone]                     Time Spent on Discharge:  Greater than 30 minutes      Marianne Rm MD  [facility]  09/01/20  5:08 PM

## 2020-09-01 NOTE — PROGRESS NOTES
Discharge Planning Assessment  [facility]     Patient Name: Nayeli Bose  MRN: [id number]  Today's Date: 9/1/2020    Admit Date: 8/30/2020    Discharge Needs Assessment     Row Name 09/01/20 1424       Living Environment    Lives With  child(sd), adult    Name(s) of Who Lives With Patient  lives with significant other and 2 children, ages 4 &7    Current Living Arrangements  home/apartment/condo    Primary Care Provided by  self    Provides Primary Care For  no one    Family Caregiver if Needed  none    Quality of Family Relationships  helpful;involved;supportive    Able to Return to Prior Arrangements  yes       Resource/Environmental Concerns    Resource/Environmental Concerns  none    Transportation Concerns  car, none       Transition Planning    Patient/Family Anticipates Transition to  home with family    Patient/Family Anticipated Services at Transition  none    Transportation Anticipated  family or friend will provide       Discharge Needs Assessment    Readmission Within the Last 30 Days  no previous admission in last 30 days    Concerns to be Addressed  no discharge needs identified    Equipment Currently Used at Home  none    Anticipated Changes Related to Illness  none        Discharge Plan     Row Name 09/01/20 1425       Plan    Plan  return home- CCP will follow as needed    Patient/Family in Agreement with Plan  yes    Plan Comments  Spoke with patient at bedside.  Facesheet, PCP and pharmacy verified.  Patient lives in a trailer with her significant other and 2 children, ages 4 & 7.  She is IADLS and does not use any DME.  She does not have a HH or SNF history.  She denies any needs and plans to return home.  CCP will follow as needed. Catrachita Ruiz RN        Destination      Coordination has not been started for this encounter.      Durable Medical Equipment      Coordination has not been started for this encounter.      Dialysis/Infusion      Coordination has not been started for this  encounter.      Home Medical Care      Coordination has not been started for this encounter.      Therapy      Coordination has not been started for this encounter.      Community Resources      Coordination has not been started for this encounter.          Demographic Summary     Row Name 09/01/20 1424       General Information    Admission Type  inpatient    Arrived From  emergency department    Referral Source  admission list    Reason for Consult  discharge planning    Preferred Language  English        Functional Status     Row Name 09/01/20 1424       Functional Status    Usual Activity Tolerance  good    Current Activity Tolerance  good       Functional Status, IADL    Medications  independent    Meal Preparation  independent    Housekeeping  independent    Laundry  independent    Shopping  independent       Mental Status    General Appearance WDL  WDL       Mental Status Summary    Recent Changes in Mental Status/Cognitive Functioning  no changes        Psychosocial    No documentation.       Abuse/Neglect    No documentation.       Legal    No documentation.       Substance Abuse    No documentation.       Patient Forms    No documentation.           Catrachita Ruiz RN

## 2020-09-01 NOTE — PLAN OF CARE
Patient alert follows commands prn pain and nausea meds given.  Plans for mri in the am, patient has been npo since 2am. Patient is ambulated in room with no assistance needed. No acute distress noted will continue to monitor.

## 2020-09-02 VITALS
RESPIRATION RATE: 16 BRPM | WEIGHT: 191.4 LBS | HEART RATE: 88 BPM | OXYGEN SATURATION: 95 % | DIASTOLIC BLOOD PRESSURE: 79 MMHG | HEIGHT: 67 IN | BODY MASS INDEX: 30.04 KG/M2 | SYSTOLIC BLOOD PRESSURE: 126 MMHG | TEMPERATURE: 98.7 F

## 2020-09-02 LAB
ANION GAP SERPL CALCULATED.3IONS-SCNC: 9 MMOL/L (ref 5–15)
BUN SERPL-MCNC: 6 MG/DL (ref 6–20)
BUN/CREAT SERPL: 12.2 (ref 7–25)
CALCIUM SPEC-SCNC: 9 MG/DL (ref 8.6–10.5)
CHLORIDE SERPL-SCNC: 101 MMOL/L (ref 98–107)
CO2 SERPL-SCNC: 26 MMOL/L (ref 22–29)
CREAT SERPL-MCNC: 0.49 MG/DL (ref 0.57–1)
CYTO UR: NORMAL
DEPRECATED RDW RBC AUTO: 52.6 FL (ref 37–54)
ERYTHROCYTE [DISTWIDTH] IN BLOOD BY AUTOMATED COUNT: 12.8 % (ref 12.3–15.4)
GFR SERPL CREATININE-BSD FRML MDRD: 146 ML/MIN/1.73
GLUCOSE SERPL-MCNC: 101 MG/DL (ref 65–99)
HCT VFR BLD AUTO: 29.6 % (ref 34–46.6)
HGB BLD-MCNC: 10.5 G/DL (ref 12–15.9)
LAB AP CASE REPORT: NORMAL
MCH RBC QN AUTO: 39.9 PG (ref 26.6–33)
MCHC RBC AUTO-ENTMCNC: 35.5 G/DL (ref 31.5–35.7)
MCV RBC AUTO: 112.5 FL (ref 79–97)
PATH REPORT.FINAL DX SPEC: NORMAL
PATH REPORT.GROSS SPEC: NORMAL
PLATELET # BLD AUTO: 245 10*3/MM3 (ref 140–450)
PMV BLD AUTO: 8.6 FL (ref 6–12)
POTASSIUM SERPL-SCNC: 4.3 MMOL/L (ref 3.5–5.2)
RBC # BLD AUTO: 2.63 10*6/MM3 (ref 3.77–5.28)
SODIUM SERPL-SCNC: 136 MMOL/L (ref 136–145)
WBC # BLD AUTO: 7.85 10*3/MM3 (ref 3.4–10.8)

## 2020-09-02 PROCEDURE — 99232 SBSQ HOSP IP/OBS MODERATE 35: CPT | Performed by: INTERNAL MEDICINE

## 2020-09-02 PROCEDURE — 25010000002 ONDANSETRON PER 1 MG: Performed by: INTERNAL MEDICINE

## 2020-09-02 PROCEDURE — 80048 BASIC METABOLIC PNL TOTAL CA: CPT | Performed by: INTERNAL MEDICINE

## 2020-09-02 PROCEDURE — 85027 COMPLETE CBC AUTOMATED: CPT | Performed by: INTERNAL MEDICINE

## 2020-09-02 RX ADMIN — HYDROCODONE BITARTRATE AND ACETAMINOPHEN 1 TABLET: 5; 325 TABLET ORAL at 10:43

## 2020-09-02 RX ADMIN — ONDANSETRON 4 MG: 2 INJECTION INTRAMUSCULAR; INTRAVENOUS at 01:13

## 2020-09-02 RX ADMIN — HYDROCODONE BITARTRATE AND ACETAMINOPHEN 1 TABLET: 5; 325 TABLET ORAL at 01:12

## 2020-09-02 RX ADMIN — FUROSEMIDE 40 MG: 40 TABLET ORAL at 08:45

## 2020-09-02 RX ADMIN — PANTOPRAZOLE SODIUM 40 MG: 40 TABLET, DELAYED RELEASE ORAL at 05:53

## 2020-09-02 RX ADMIN — PENTOXIFYLLINE 400 MG: 400 TABLET, EXTENDED RELEASE ORAL at 08:44

## 2020-09-02 RX ADMIN — BUSPIRONE HYDROCHLORIDE 5 MG: 5 TABLET ORAL at 08:44

## 2020-09-02 RX ADMIN — SODIUM CHLORIDE, PRESERVATIVE FREE 10 ML: 5 INJECTION INTRAVENOUS at 08:44

## 2020-09-02 RX ADMIN — SENNOSIDES 2 TABLET: 8.6 TABLET, FILM COATED ORAL at 08:44

## 2020-09-02 RX ADMIN — FOLIC ACID 1 MG: 1 TABLET ORAL at 08:44

## 2020-09-02 RX ADMIN — PAROXETINE 10 MG: 10 TABLET, FILM COATED ORAL at 05:53

## 2020-09-02 RX ADMIN — POTASSIUM CHLORIDE 20 MEQ: 10 CAPSULE, COATED, EXTENDED RELEASE ORAL at 08:44

## 2020-09-02 RX ADMIN — ONDANSETRON 4 MG: 2 INJECTION INTRAMUSCULAR; INTRAVENOUS at 08:45

## 2020-09-02 RX ADMIN — HYDROCODONE BITARTRATE AND ACETAMINOPHEN 1 TABLET: 5; 325 TABLET ORAL at 05:53

## 2020-09-02 RX ADMIN — SPIRONOLACTONE 100 MG: 100 TABLET, FILM COATED ORAL at 08:44

## 2020-09-02 NOTE — PROGRESS NOTES
Patient discharge was placed on hold from yesterday per GI request.  Subjective.  Patient reports occasional abdominal pain.  No change in abdominal distention.  No nausea or vomiting no fever chills normal bowel habits tolerating p.o. no chest pain no shortness of breath no cough he has no hemoptysis  Objective.  Patient is afebrile with stable vital signs  General.  Alert oriented x3 no apparent pain distress or diaphoresis normal mood and affect  Eyes.  Pupils equal round and reactive intact extraocular musculature no pallor no jaundice normal conjunctivae and lids  ENT.  Moist mucous membrane no throat lesions or exudates  Neck.  Supple no JVD no lymphadenopathy  Chest.  Clear to auscultation bilaterally with no added sounds  Cardiovascular.  Regular rate and rhythm no gallops or murmur  Abdomen.  Mildly distended soft lax no tenderness no organomegaly no guarding or rebound normal bowel sounds positive small to moderate ascites  Extremities.  No clubbing cyanosis or edema  CNS.  No acute focal neurological deficits  Diagnostics and labs all noted including an MRI that revealed no discrete liver mass.  Patient will be seeing the dietitian today to instruct her about low-sodium diet and hepatic diet.  Patient chooses to follow-up with [facility] GI service and this is being arranged by GI as they anticipate repeat paracentesis soon.  GI okayed the discharge.  These look at the updated discharge summary.

## 2020-09-02 NOTE — PROGRESS NOTES
[facility]  Inpatient Progress Note    Reason for Follow Up: Cirrhosis and ascites status post paracentesis    Subjective     Interval History:   Paracentesis yesterday and IV albumin replacement after    Current Facility-Administered Medications:   •  bisacodyl (DULCOLAX) EC tablet 5 mg, 5 mg, Oral, Daily PRN, Marianne Rm MD  •  busPIRone (BUSPAR) tablet 5 mg, 5 mg, Oral, TID, Marianne Rm MD, 5 mg at 09/02/20 0844  •  cyclobenzaprine (FLEXERIL) tablet 10 mg, 10 mg, Oral, TID PRN, Marianne Rm MD  •  diphenhydrAMINE (BENADRYL) capsule 25 mg, 25 mg, Oral, Q6H PRN, Marianne Rm MD, 25 mg at 09/01/20 0038  •  folic acid (FOLVITE) tablet 1 mg, 1 mg, Oral, Daily, Marianne Rm MD, 1 mg at 09/02/20 0844  •  furosemide (LASIX) tablet 40 mg, 40 mg, Oral, Daily, Deann Luo MD, 40 mg at 09/02/20 0845  •  HYDROcodone-acetaminophen (NORCO) 5-325 MG per tablet 1 tablet, 1 tablet, Oral, Q4H PRN, Marianne Rm MD, 1 tablet at 09/02/20 0553  •  melatonin tablet 5 mg, 5 mg, Oral, Nightly PRN, Marianne Rm MD, 5 mg at 09/01/20 2046  •  nitroglycerin (NITROSTAT) SL tablet 0.4 mg, 0.4 mg, Sublingual, Q5 Min PRN, Marianne Rm MD  •  ondansetron (ZOFRAN) tablet 4 mg, 4 mg, Oral, Q6H PRN **OR** ondansetron (ZOFRAN) injection 4 mg, 4 mg, Intravenous, Q6H PRN, Marianne Rm MD, 4 mg at 09/02/20 0845  •  pantoprazole (PROTONIX) EC tablet 40 mg, 40 mg, Oral, QAM, Marianne Rm MD, 40 mg at 09/02/20 0553  •  PARoxetine (PAXIL) tablet 10 mg, 10 mg, Oral, QAM, Marianne Rm MD, 10 mg at 09/02/20 0553  •  pentoxifylline (TRENtal) CR tablet 400 mg, 400 mg, Oral, TID With Meals, Marianne Rm MD, 400 mg at 09/02/20 0844  •  potassium chloride (KLOR-CON) packet 40 mEq, 40 mEq, Oral, PRN, Marianne Rm MD  •  potassium chloride (MICRO-K) CR capsule 20 mEq, 20 mEq, Oral, TID With Meals, Marianne Rm MD, 20 mEq at 09/02/20 0844  •  potassium chloride  (MICRO-K) CR capsule 40 mEq, 40 mEq, Oral, PRN, Marianne Rm MD, 40 mEq at 09/01/20 1718  •  senna (SENOKOT) tablet 2 tablet, 2 tablet, Oral, Daily, Marianne Rm MD, 2 tablet at 09/02/20 0844  •  [COMPLETED] Insert peripheral IV, , , Once **AND** sodium chloride 0.9 % flush 10 mL, 10 mL, Intravenous, PRN, Marianne Rm MD  •  sodium chloride 0.9 % flush 10 mL, 10 mL, Intravenous, Q12H, Marianne Rm MD, 10 mL at 09/02/20 0844  •  sodium chloride 0.9 % flush 10 mL, 10 mL, Intravenous, PRN, Marianne Rm MD  •  spironolactone (ALDACTONE) tablet 100 mg, 100 mg, Oral, Daily, Deann Luo MD, 100 mg at 09/02/20 0844  Review of Systems:    She has weakness fatigue all other systems reviewed and negative    Objective     Vital Signs  Temp:  [98.4 °F (36.9 °C)-98.7 °F (37.1 °C)] 98.7 °F (37.1 °C)  Heart Rate:  [] 88  Resp:  [16] 16  BP: (126-136)/(79-81) 126/79  Body mass index is 29.98 kg/m².    Intake/Output Summary (Last 24 hours) at 9/2/2020 1035  Last data filed at 9/2/2020 0910  Gross per 24 hour   Intake 780 ml   Output --   Net 780 ml     I/O this shift:  In: 210 [P.O.:210]  Out: -      Physical Exam:   General: patient awake, alert and cooperative   Eyes: Normal lids and lashes, no scleral icterus   Neck: supple, normal ROM   Skin: warm and dry, not jaundiced   Cardiovascular: regular rhythm and rate, no murmurs auscultated   Pulm: clear to auscultation bilaterally, regular and unlabored   Abdomen: soft, nontender, nondistended; normal bowel sounds   Extremities: no rash or edema   Psychiatric: Normal mood and behavior; memory intact     Results Review:     I reviewed the patient's new clinical results.    Results from last 7 days   Lab Units 09/02/20  0824 09/01/20  0835 08/30/20  1219   WBC 10*3/mm3 7.85 10.49 11.85*   HEMOGLOBIN g/dL 10.5* 10.5* 11.1*   HEMATOCRIT % 29.6* 28.3* 31.4*   PLATELETS 10*3/mm3 245 273 317     Results from last 7 days   Lab Units 09/02/20  0824  09/01/20  0835 08/30/20  1219   SODIUM mmol/L 136 134* 132*   POTASSIUM mmol/L 4.3 3.3* 3.1*   CHLORIDE mmol/L 101 97* 94*   CO2 mmol/L 26.0 26.5 22.2   BUN mg/dL 6 4* 7   CREATININE mg/dL 0.49* 0.43* 0.59   CALCIUM mg/dL 9.0 8.5* 9.1   BILIRUBIN mg/dL  --  3.9* 5.8*   ALK PHOS U/L  --  166* 184*   ALT (SGPT) U/L  --  46* 53*   AST (SGOT) U/L  --  79* 99*   GLUCOSE mg/dL 101* 121* 122*     Results from last 7 days   Lab Units 08/31/20  0654 08/30/20  1219   INR  1.38* 1.30*     Lab Results   Lab Value Date/Time    LIPASE 51 08/30/2020 1219       Radiology:  MRI Abdomen With & Without Contrast   Final Result   1. Very heterogeneous liver with heterogeneous fatty infiltration   throughout the liver parenchyma. There is an approximately 8 x 6 cm   masslike region of focal fatty infiltration at the medial hepatic   segment. There is no suspicious or enhancing liver mass. Please   correlate with the alpha-fetoprotein. If there is an elevation of the   alpha-fetoprotein, a follow-up liver MRI is recommended in 6 months or   sooner if indicated.   2. There is portal hypertension with varices and recanalization of the   periumbilical vein. Moderate volume of ascites. The diminutive size of   the right portal vein suggests the possibility of old thrombosis or   partial thrombosis. There is no evidence for acute thrombus within the   portal vein, splenic vein, or SMV.       This report was finalized on 9/1/2020 3:31 PM by Dr. Lu Bryson M.D.          US Paracentesis   Final Result   Ultrasound-guided paracentesis as described.       This report was finalized on 9/1/2020 1:06 PM by Dr. Royce Smith M.D.          CT Abdomen Pelvis With Contrast   Final Result       1. Abnormal thickening of the colon wall from the cecum to the distal   transverse colon suggest nonspecific colitis.   2. The liver is markedly heterogeneous. A mass apparent centrally in the   liver, 5.1 x 8.0 cm on axial image 51 shows CT density of 30,    possibility of neoplasm not excluded, multi phase enhanced liver MRI   recommended for further evaluation.. Mild splenomegaly.       3. Moderate abdominal and pelvic ascites. Assessment for inflammatory   changes is limited by diffuse mesenteric edema. Some stranding around   the pancreas may relate to diffuse mesenteric edema or could be evidence   of pancreatitis.       Discussed by telephone with Dr. Alas at 1424, 08/30/2020.       This report was finalized on 8/30/2020 2:27 PM by Dr. Arnav Staley M.D.          XR Chest 1 View   Final Result   Small left basilar atelectasis. Borderline size of the   cardiac silhouette.         This report was finalized on 8/30/2020 1:04 PM by Dr. Arnav Staley M.D.              Assessment/Plan     Patient Active Problem List   Diagnosis   • Cirrhosis of liver (CMS/HCC)   • Ascites   • Hypokalemia   • Hyponatremia   • Anemia, chronic disease   • Anxiety and depression       Impression  1.  Cirrhosis with ascites: She has 2 different hemochromatosis gene mutations which does not indicate hereditary hemochromatosis but does put her at higher risk if she has a second hit to the liver.  It does sound like there is at least moderate alcohol use in the past which likely provided the second hit to her.  Biopsy in her discharge summary from [facility] reported as showing iron stores as well as cirrhosis     2.  Elevated ferritin level: Her saturation here is normal     3.  Ascites: Quite pronounced.  Apparently this is been difficult to control despite diuretics at home     4.  Abnormal CT imaging of the liver: Concern for a mass.,  MRI results above likely no mass, normal AFP     5. Elevated transaminase, bilirubin: with #1, she was also treated for alcoholic hepatitis at [facility] last week     Plan  Paracentesis complete, no SBP  We will try to obtain the direct  Avoid IV diuretics to limit effect on renal function  Follow-up with Dr. Valero/Liz as an outpatient,  patient choice (as opposed to [facility] hepatology), nurses on the floor arranging follow-up  Low-sodium diet  Counseled on no further toxins to liver including no alcohol ever again  Repeat MRI of the liver in 6 months to check that area  AFP level normal  Dietitian with low-sodium diet counseling  Okay for discharge home after dietitian sees patient    I discussed the patients findings and my recommendations with patient and nursing staff.    Bubba Lyn MD

## 2020-09-02 NOTE — PAYOR COMM NOTE
"Nayeli Bose (30 y.o. Female)     ATTN:  DISCHARGE SUMMARY FOR REVIEW, [id number]     DEPT: RILEY KING RN, [phone], [phone]        Date of Birth Social Security Number Address Home Phone MRN    1990  [id number] [facility] [phone] [id number]    Scientologist Marital Status          Worship Single       Admission Date Admission Type Admitting Provider Attending Provider Department, Room/Bed    20 Emergency Marianne Rm MD  [address], E668/1    Discharge Date Discharge Disposition Discharge Destination        2020 Home or Self Care              Attending Provider:  (none)   Allergies:  No Known Allergies    Isolation:  None   Infection:  None   Code Status:  CPR    Ht:  170.2 cm (67\")   Wt:  86.8 kg (191 lb 6.4 oz)    Admission Cmt:  None   Principal Problem:  Ascites [R18.8]                 Active Insurance as of 2020     Primary Coverage     Payor Plan Insurance Group Employer/Plan Group    [employer]AN PAS[employer]ORT [id number]     Payor Plan Address Payor Plan Phone Number Payor Plan Fax Number Effective Dates    [address] [phone]  10/1/2015 - None Entered    [facility] [id number]       Subscriber Name Subscriber Birth Date Member ID       NAYELI BOSE 1990 [id number]                 Emergency Contacts      (Rel.) Home Phone Work Phone Mobile Phone    DOROTHY SAHNI (Father) [phone] -- --    Nicole Kellogg (Mother) -- -- [phone]               Discharge Summary      Marianne Rm MD at 20 1708              Patient Name: Nayeli Bose  : 1990  MRN: [id number]    Date of Admission: 2020  Date of Discharge:  2020  Primary Care Physician: Shala Garcia APRN      Discharge Diagnoses     Active Hospital Problems    Diagnosis  POA   • **Ascites [R18.8]  Yes   • Cirrhosis of liver (CMS/HCC) [K74.60]  Yes   • Hypokalemia [E87.6]  Yes   • Hyponatremia [E87.1]  Yes "   • Anemia, chronic disease [D63.8]  Yes   • Anxiety and depression [F41.9, F32.9]  Yes      Resolved Hospital Problems    Diagnosis Date Resolved POA   • Generalized abdominal pain [R10.84] 09/01/2020 Yes   • Fever [R50.9] 09/01/2020 Yes   • Leukocytosis [D72.829] 09/01/2020 Yes        Hospital Course     Brief admission history and physical.  These refer to the H&P for full details.  A 30 years old white female recently diagnosed with liver cirrhosis and portal hypertension because of hemochromatosis and possibly alcohol.  She was recently discharged from [facility].  She presented to the hospital because of increasing abdominal distention and abdominal pain.  No nausea no vomiting no diarrhea no constipation no bleeding per rectum no melena.  There was positive exertional dyspnea.  Her physical examination was remarkable for temperature 98.4 a heart rate of 108 respiratory of 16 and blood pressure 120/75 and O2 sats of 93% on room air.  The rest of the examination is remarkable for tinge of jaundice in the sclera mild tachycardia with a grade 2 systolic murmur positive abdominal ascites and distention.  Hospital course she will ER evaluation included CMP that was normal except a blood sugar of 122 sodium 132 potassium 3.1 chloride 94 ALT 53 AST 99 alkaline phosphatase 184.  INR 1.3 PTT 36.5.  Lipase was normal.  Magnesium was normal.  hCG was negative.  Ethanol was negative.  CBC was normal except a hemoglobin of 11.1 white count 11.8.  Pro-Jase was elevated at 2.46.  UA was negative for UTI.  Lactate was elevated at 3.  CT scan of the abdomen and pelvis with IV contrast revealed normal thickening of the colon that could suggest nonspecific colitis.  Liver markedly heterogenous with a possible neoplastic process.  Moderate abdominal pelvic ascites.  Chest x-ray revealed small left basilar atelectasis.  The patient was admitted with a diagnosis of liver cirrhosis/portal hypertension/ascites with a  previous work-up at the [facility] including a liver biopsy suggestive of hemochromatosis as a cause possibly alcohol.  Therapeutic and diagnostic paracentesis was performed she was empirically started on IV diuresis and GI was consulted she was empirically started on Rocephin peritoneal fluid revealed no infection and the Rocephin was stopped GI switched the patient back to p.o. diuretics and they have cleared her by the time of discharge after paracentesis the patient felt much better with decrease of the abdominal distention and abdominal pain she was tolerating p.o. well.  She was maintained on her Lasix/Aldactone/Trental her liver function tests remained stable and actually was trending down.. Patient had an MRI of her abdomen to evaluate for the liver mass there was no discrete liver mass and mostly at dense collection of patchy infiltrates.  Cultures were negative by the time of discharge.  She was noted initially to have low-grade fever and mild leukocytosis chest x-ray and UA were negative for infection there was no evidence of spontaneous bacterial peritonitis by the fluid with negative cultures and no organisms seen by the Gram stain.  Her low-grade fever and leukocytosis has resolved by the time of discharge.  She was noted to have hyperglycemia A1c was normal.  She was noted to have hypokalemia and this was substituted prior to discharge and magnesium was normal.  Her hyponatremia has improved.  She was noticed to be anemic anemia work-up revealed anemia of chronic disease.  She has a history of anxiety and depression continue BuSpar and Paxil.  She was asked to follow-up with the primary care physician in 1 week for monitoring of her chemistries and potassium.  Also to keep her follow-up with the GI clinic at [facility] and hematology oncology clinic at [facility] for the hemochromatosis and the possible liver mass.  Patient was hemodynamically stable and tolerating  p.o. at the time of discharge.  GI has okayed the discharge and they will arrange a follow-up with them for a paracentesis soon.  Nutritionist will see the patient prior to discharge to indicate her about the the hepatic diet and low-sodium diet..    Consultants     Consult Orders (all) (From admission, onward)     Start     Ordered    08/30/20 1701  Inpatient Gastroenterology Consult  Once     Specialty:  Gastroenterology  Provider:  Clayton Rooeny MD    08/30/20 1712 08/30/20 1701  Inpatient Case Management  Consult  Once     Provider:  (Not yet assigned)    08/30/20 1712    08/30/20 1429  [facility] (on-call MD unless specified) Details  Once     Specialty:  Hospitalist  Provider:  Marianne Rm MD    08/30/20 1428              Procedures     Imaging Results (All)     Procedure Component Value Units Date/Time    MRI Abdomen With & Without Contrast [[id number]] Collected:  09/01/20 0855     Updated:  09/01/20 1534    Narrative:       MRI ABDOMEN WITH/WITHOUT CONTRAST-     HISTORY: 30-year-old female with alcoholic hepatitis and cirrhosis.  Evaluate for a liver lesion.     TECHNIQUE: Routine liver MRI was performed without and with IV contrast.  Compared with CT of the abdomen from 08/30/2020.     FINDINGS: The liver is very heterogeneous in signal and is most  heterogeneous on the out of phase sequence which shows multiple  heterogeneous regions of fatty infiltration. There is a masslike region  of fatty infiltration at the medial hepatic segment which measures  approximately 8 x 5.5 cm. The enhancement pattern is also heterogeneous.  No suspicious enhancing mass is seen. The main portal vein is patent,  but is smaller than expected. The right portal venous branches are  diminutive as well. There is recanalization of the periumbilical vein  and there are varices at the GE junction and surrounding the stomach.  The spleen measures approximately 13.5 cm in diameter. The gallbladder  is  slightly contracted. There is no biliary dilatation. No abnormality  seen at the pancreas or adrenals.       Impression:       1. Very heterogeneous liver with heterogeneous fatty infiltration  throughout the liver parenchyma. There is an approximately 8 x 6 cm  masslike region of focal fatty infiltration at the medial hepatic  segment. There is no suspicious or enhancing liver mass. Please  correlate with the alpha-fetoprotein. If there is an elevation of the  alpha-fetoprotein, a follow-up liver MRI is recommended in 6 months or  sooner if indicated.  2. There is portal hypertension with varices and recanalization of the  periumbilical vein. Moderate volume of ascites. The diminutive size of  the right portal vein suggests the possibility of old thrombosis or  partial thrombosis. There is no evidence for acute thrombus within the  portal vein, splenic vein, or SMV.     This report was finalized on 9/1/2020 3:31 PM by Dr. Lu Bryson M.D.        Paracentesis [[id number]] Collected:  09/01/20 1306    Specimen:  Body Fluid Updated:  09/01/20 1309    Narrative:       ULTRASOUND-GUIDED PARACENTESIS     HISTORY: Ascites     After signed informed consent was obtained, the left lower quadrant was  prepped and draped in the usual sterile fashion. Lidocaine was used for  local anesthesia.     The paracentesis catheter was inserted into the left lower quadrant  using ultrasound guidance. 5300 mL yellow color ascites was removed.  Sample sent to the lab.     Confirmatory images were obtained.     Patient tolerated the procedure well with no complications.       Impression:       Ultrasound-guided paracentesis as described.     This report was finalized on 9/1/2020 1:06 PM by Dr. Royce Smith M.D.       CT Abdomen Pelvis With Contrast [[id number]] Collected:  08/30/20 1416     Updated:  08/30/20 1430    Narrative:       CT ABDOMEN PELVIS W CONTRAST-     INDICATIONS: abdominal pain     TECHNIQUE: Radiation dose reduction  techniques were utilized, including  automated exposure control and exposure modulation based on body size.  Enhanced  ABDOMEN AND PELVIS CT     COMPARISON: None available     FINDINGS:      Spleen is enlarged 13.3 cm.     The liver is markedly heterogeneous. Compared with the spleen, the liver  shows diffuse low-density suggesting steatosis. A heterogeneous mass  apparent centrally in the liver, 5.1 x 8.0 cm on axial image 51 shows CT  density of 30, possibility of neoplasm not excluded, multi phase  enhanced liver MRI recommended for further evaluation..     Moderate abdominal and pelvic ascites.     Assessment for inflammatory changes is limited by diffuse mesenteric  edema. Some stranding around the pancreas may relate to diffuse  mesenteric edema or could be evidence of pancreatitis.           Otherwise unremarkable appearance of the liver, spleen, adrenal glands,  pancreas, kidneys, bladder.     No bowel obstruction. Abnormal thickening of the colon wall from the  cecum to the distal transverse colon suggest nonspecific colitis.     No free intraperitoneal gas.     Scattered small mesenteric and para-aortic lymph nodes are seen that are  not significant by size criteria.     Abdominal aorta is not aneurysmal.     The lung bases show atelectasis, minimal left pleural effusion.     Degenerative changes are seen in the spine. Old left rib fractures.             Impression:          1. Abnormal thickening of the colon wall from the cecum to the distal  transverse colon suggest nonspecific colitis.  2. The liver is markedly heterogeneous. A mass apparent centrally in the  liver, 5.1 x 8.0 cm on axial image 51 shows CT density of 30,  possibility of neoplasm not excluded, multi phase enhanced liver MRI  recommended for further evaluation.. Mild splenomegaly.     3. Moderate abdominal and pelvic ascites. Assessment for inflammatory  changes is limited by diffuse mesenteric edema. Some stranding around  the pancreas  may relate to diffuse mesenteric edema or could be evidence  of pancreatitis.     Discussed by telephone with Dr. Alas at 1424, 08/30/2020.     This report was finalized on 8/30/2020 2:27 PM by Dr. Arnav Staley M.D.       XR Chest 1 View [[id number]] Collected:  08/30/20 1302     Updated:  08/30/20 1307    Narrative:       XR CHEST 1 VW-     HISTORY: Female who is 30 years-old,  pain     TECHNIQUE: Frontal view of the chest     COMPARISON: None available     FINDINGS: The size of the cardiac silhouette appears borderline could  reflect borderline heart size and/or pericardial effusion. Pulmonary  vasculature is unremarkable. Small left basilar atelectasis. No pleural  effusion, or pneumothorax. Calcified nodule projecting at the right lung  suggests old granulomatous disease. No acute osseous process.       Impression:       Small left basilar atelectasis. Borderline size of the  cardiac silhouette.       This report was finalized on 8/30/2020 1:04 PM by Dr. Arnav Staley M.D.             Pertinent Labs     Results from last 7 days   Lab Units 09/01/20  0835 08/30/20  1219   WBC 10*3/mm3 10.49 11.85*   HEMOGLOBIN g/dL 10.5* 11.1*   PLATELETS 10*3/mm3 273 317     Results from last 7 days   Lab Units 09/01/20  0835 08/30/20  1219   SODIUM mmol/L 134* 132*   POTASSIUM mmol/L 3.3* 3.1*   CHLORIDE mmol/L 97* 94*   CO2 mmol/L 26.5 22.2   BUN mg/dL 4* 7   CREATININE mg/dL 0.43* 0.59   GLUCOSE mg/dL 121* 122*   Estimated Creatinine Clearance: 213.2 mL/min (A) (by C-G formula based on SCr of 0.43 mg/dL (L)).  Results from last 7 days   Lab Units 09/01/20  0835 08/30/20  1219   ALBUMIN g/dL 3.30* 3.70   BILIRUBIN mg/dL 3.9* 5.8*   ALK PHOS U/L 166* 184*   AST (SGOT) U/L 79* 99*   ALT (SGPT) U/L 46* 53*     Results from last 7 days   Lab Units 09/01/20  0835 08/30/20  1219   CALCIUM mg/dL 8.5* 9.1   ALBUMIN g/dL 3.30* 3.70   MAGNESIUM mg/dL  --  1.7     Results from last 7 days   Lab Units 08/30/20  3654    LIPASE U/L 51             Invalid input(s): LDLCALC  Results from last 7 days   Lab Units 08/31/20  1615 08/30/20  1507 08/30/20  1456   BLOODCX   --  No growth at 2 days No growth at 2 days   BODYFLDCX  No growth  --   --      Imaging Results (Last 24 Hours)     Procedure Component Value Units Date/Time    MRI Abdomen With & Without Contrast [[id number]] Collected:  09/01/20 0855     Updated:  09/01/20 1534    Narrative:       MRI ABDOMEN WITH/WITHOUT CONTRAST-     HISTORY: 30-year-old female with alcoholic hepatitis and cirrhosis.  Evaluate for a liver lesion.     TECHNIQUE: Routine liver MRI was performed without and with IV contrast.  Compared with CT of the abdomen from 08/30/2020.     FINDINGS: The liver is very heterogeneous in signal and is most  heterogeneous on the out of phase sequence which shows multiple  heterogeneous regions of fatty infiltration. There is a masslike region  of fatty infiltration at the medial hepatic segment which measures  approximately 8 x 5.5 cm. The enhancement pattern is also heterogeneous.  No suspicious enhancing mass is seen. The main portal vein is patent,  but is smaller than expected. The right portal venous branches are  diminutive as well. There is recanalization of the periumbilical vein  and there are varices at the GE junction and surrounding the stomach.  The spleen measures approximately 13.5 cm in diameter. The gallbladder  is slightly contracted. There is no biliary dilatation. No abnormality  seen at the pancreas or adrenals.       Impression:       1. Very heterogeneous liver with heterogeneous fatty infiltration  throughout the liver parenchyma. There is an approximately 8 x 6 cm  masslike region of focal fatty infiltration at the medial hepatic  segment. There is no suspicious or enhancing liver mass. Please  correlate with the alpha-fetoprotein. If there is an elevation of the  alpha-fetoprotein, a follow-up liver MRI is recommended in 6 months or  sooner if  indicated.  2. There is portal hypertension with varices and recanalization of the  periumbilical vein. Moderate volume of ascites. The diminutive size of  the right portal vein suggests the possibility of old thrombosis or  partial thrombosis. There is no evidence for acute thrombus within the  portal vein, splenic vein, or SMV.     This report was finalized on 9/1/2020 3:31 PM by Dr. Lu Bryson M.D.        Paracentesis [[id number]] Collected:  09/01/20 1306    Specimen:  Body Fluid Updated:  09/01/20 1309    Narrative:       ULTRASOUND-GUIDED PARACENTESIS     HISTORY: Ascites     After signed informed consent was obtained, the left lower quadrant was  prepped and draped in the usual sterile fashion. Lidocaine was used for  local anesthesia.     The paracentesis catheter was inserted into the left lower quadrant  using ultrasound guidance. 5300 mL yellow color ascites was removed.  Sample sent to the lab.     Confirmatory images were obtained.     Patient tolerated the procedure well with no complications.       Impression:       Ultrasound-guided paracentesis as described.     This report was finalized on 9/1/2020 1:06 PM by Dr. Royce Smith M.D.             Test Results Pending at Discharge      Order Current Status    Anaerobic Culture - Body Fluid, Peritoneum In process    Non-gynecologic Cytology In process    Blood Culture - Blood, Arm, Left Preliminary result    Blood Culture - Blood, Arm, Left Preliminary result    Body Fluid Culture - Body Fluid, Peritoneum Preliminary result            Discharge Exam   Physical Exam  General.  A young female alert oriented x3 in no apparent pain distress or diaphoresis normal mood and affect  Eyes.   pupils equal round and reactive intact extraocular musculature no pallor.  No jaundice.  Oral cavity.  Most mucous membrane no throat lesions or exudates  Neck.  Supple no JVD no carotid bruit no lymphadenopathy no thyromegaly  Cardiovascular.  Regular rate and rhythm.   Grade 2 systolic murmur.    Chest.  Clear to auscultation bilaterally with no added sounds  Abdomen.  Soft lax no tenderness no organomegaly no guarding or rebound  Mild distention with small amount of ascites by shifting dullness..  Extremities.  No clubbing cyanosis or edema  CNS.  No acute focal neurological deficits  Discharge Details        Discharge Medications      New Medications      Instructions Start Date   potassium chloride 10 MEQ CR capsule  Commonly known as:  MICRO-K   20 mEq, Oral, 3 Times Daily With Meals         Continue These Medications      Instructions Start Date   busPIRone 5 MG tablet  Commonly known as:  BUSPAR   5 mg, Oral, 3 Times Daily      cyclobenzaprine 10 MG tablet  Commonly known as:  FLEXERIL   10 mg, Oral, 3 Times Daily PRN      diphenhydrAMINE 25 mg capsule  Commonly known as:  BENADRYL   25 mg, Oral, Every 6 Hours PRN      folic acid 1 MG tablet  Commonly known as:  FOLVITE   1 mg, Oral, Daily      furosemide 40 MG tablet  Commonly known as:  LASIX   40 mg, Oral, Daily      melatonin 5 MG tablet tablet   5 mg, Oral      multivitamin with minerals tablet tablet   1 tablet, Oral, Daily      pantoprazole 40 MG EC tablet  Commonly known as:  PROTONIX   40 mg, Oral, Daily      PARoxetine 10 MG tablet  Commonly known as:  PAXIL   10 mg, Oral, Every Morning      pentoxifylline 400 MG CR tablet  Commonly known as:  TRENtal   400 mg, Oral, 3 Times Daily With Meals      PROBIOTIC DAILY PO   Oral      spironolactone 100 MG tablet  Commonly known as:  ALDACTONE   100 mg, Oral, Daily      Zinc 50 MG capsule   Oral             No Known Allergies      Discharge Disposition:  Condition: Stable    Diet:   Diet Order   Procedures   • Diet Regular; Low Sodium; 2,000 mg Na       Activity:   Activity Instructions     Activity as Tolerated            Counseling : DC any alcohol    CODE STATUS:    Code Status and Medical Interventions:   Ordered at: 08/30/20 7444     Level Of Support Discussed With:     Patient     Code Status:    CPR     Medical Interventions (Level of Support Prior to Arrest):    Full       No future appointments.  Additional Instructions for the Follow-ups that You Need to Schedule     Call MD With Problems / Concerns   As directed      Instructions: Return to emergency department will call MD if increasing abdominal pain/nausea or vomiting/shortness of breath/palpitations/chest pain    Order Comments:  Instructions: Return to emergency department will call MD if increasing abdominal pain/nausea or vomiting/shortness of breath/palpitations/chest pain          Discharge Follow-up with PCP   As directed       Currently Documented PCP:    Shala Garcia APRN    PCP Phone Number:    [phone]     Follow Up Details:  Primary MD in 1 week for liver cirrhosis/ascites         Discharge Follow-up with Specified Provider: GI at the [facility] as previously scheduled; 2 Weeks   As directed      To:  GI at the [facility] as previously scheduled    Follow Up:  2 Weeks    Follow Up Details:  Liver cirrhosis/ascites         Discharge Follow-up with Specified Provider: Hematology oncology at [facility] at the [facility] as scheduled; 1 Month   As directed      To:  Hematology oncology at [facility] at [facility] as scheduled    Follow Up:  1 Month    Follow Up Details:  Liver mass           Follow-up Information     Shala Garcia APRN .    Specialty:  Family Medicine  Why:  Primary MD in 1 week for liver cirrhosis/ascites  Contact information:  [address] [address]  [phone]                     Time Spent on Discharge:  Greater than 30 minutes      Marianne Rm MD  [facility]  20  5:08 PM    Electronically signed by Marianne Rm MD at 20 1122     Marianne Rm MD at 20 1122              Patient Name: Nayeli Bose  :  1990  MRN: [id number]    Date of Admission: 8/30/2020  Date of Discharge:  9/2/2020  Primary Care Physician: Shala Garcia, APRN      Discharge Diagnoses     Active Hospital Problems    Diagnosis  POA   • **Ascites [R18.8]  Yes   • Cirrhosis of liver (CMS/HCC) [K74.60]  Yes   • Hypokalemia [E87.6]  Yes   • Hyponatremia [E87.1]  Yes   • Anemia, chronic disease [D63.8]  Yes   • Anxiety and depression [F41.9, F32.9]  Yes      Resolved Hospital Problems    Diagnosis Date Resolved POA   • Generalized abdominal pain [R10.84] 09/01/2020 Yes   • Fever [R50.9] 09/01/2020 Yes   • Leukocytosis [D72.829] 09/01/2020 Yes        Hospital Course   Brief admission history and physical.  These refer to the H&P for full details.  A 30 years old white female recently diagnosed with liver cirrhosis and portal hypertension because of hemochromatosis and possibly alcohol.  She was recently discharged from [facility].  She presented to the hospital because of increasing abdominal distention and abdominal pain.  No nausea no vomiting no diarrhea no constipation no bleeding per rectum no melena.  There was positive exertional dyspnea.  Her physical examination was remarkable for temperature 98.4 a heart rate of 108 respiratory of 16 and blood pressure 120/75 and O2 sats of 93% on room air.  The rest of the examination is remarkable for tinge of jaundice in the sclera mild tachycardia with a grade 2 systolic murmur positive abdominal ascites and distention.  Hospital course she will ER evaluation included CMP that was normal except a blood sugar of 122 sodium 132 potassium 3.1 chloride 94 ALT 53 AST 99 alkaline phosphatase 184.  INR 1.3 PTT 36.5.  Lipase was normal.  Magnesium was normal.  hCG was negative.  Ethanol was negative.  CBC was normal except a hemoglobin of 11.1 white count 11.8.  Pro-Jase was elevated at 2.46.  UA was negative for UTI.  Lactate was elevated at 3.  CT scan of the abdomen and pelvis with IV  contrast revealed normal thickening of the colon that could suggest nonspecific colitis.  Liver markedly heterogenous with a possible neoplastic process.  Moderate abdominal pelvic ascites.  Chest x-ray revealed small left basilar atelectasis.  The patient was admitted with a diagnosis of liver cirrhosis/portal hypertension/ascites with a previous work-up at the [facility] including a liver biopsy suggestive of hemochromatosis as a cause possibly alcohol.  Therapeutic and diagnostic paracentesis was performed she was empirically started on IV diuresis and GI was consulted she was empirically started on Rocephin peritoneal fluid revealed no infection and the Rocephin was stopped GI switched the patient back to p.o. diuretics and they have cleared her by the time of discharge after paracentesis the patient felt much better with decrease of the abdominal distention and abdominal pain she was tolerating p.o. well.  She was maintained on her Lasix/Aldactone/Trental her liver function tests remained stable and actually was trending down.. Patient had an MRI of her abdomen to evaluate for the liver mass there was no discrete liver mass and mostly at dense collection of patchy infiltrates.  Cultures were negative by the time of discharge.  She was noted initially to have low-grade fever and mild leukocytosis chest x-ray and UA were negative for infection there was no evidence of spontaneous bacterial peritonitis by the fluid with negative cultures and no organisms seen by the Gram stain.  Her low-grade fever and leukocytosis has resolved by the time of discharge.  She was noted to have hyperglycemia A1c was normal.  She was noted to have hypokalemia and this was substituted prior to discharge and magnesium was normal.  Her hyponatremia has improved.  She was noticed to be anemic anemia work-up revealed anemia of chronic disease.  She has a history of anxiety and depression continue BuSpar and Paxil.  She was  asked to follow-up with the primary care physician in 1 week for monitoring of her chemistries and potassium.  Also to keep her follow-up with the GI clinic at [facility] and hematology oncology clinic at [facility] for the hemochromatosis and the possible liver mass.  Patient was hemodynamically stable and tolerating p.o. at the time of discharge.  GI has okayed the discharge and they will arrange a follow-up with them for a paracentesis soon.  Nutritionist will see the patient prior to discharge to indicate her about the the hepatic diet and low-sodium diet..         Consultants     Consult Orders (all) (From admission, onward)     Start     Ordered    09/02/20 0824  Inpatient Nutrition Consult  Once     Comments:  Low Sodium diet.  Pt will be getting cont paracentesis and needs to be educated on sodium intake.  Thank you.   Provider:  (Not yet assigned)    09/02/20 0826    08/30/20 1701  Inpatient Gastroenterology Consult  Once     Specialty:  Gastroenterology  Provider:  Clayton Rooney MD    08/30/20 1712    08/30/20 1701  Inpatient Case Management  Consult  Once     Provider:  (Not yet assigned)    08/30/20 1712    08/30/20 1429  [facility] (on-call MD unless specified) Details  Once     Specialty:  Hospitalist  Provider:  Marianne Rm MD    08/30/20 1428              Procedures     Imaging Results (All)     Procedure Component Value Units Date/Time    MRI Abdomen With & Without Contrast [[id number]] Collected:  09/01/20 0855     Updated:  09/01/20 1534    Narrative:       MRI ABDOMEN WITH/WITHOUT CONTRAST-     HISTORY: 30-year-old female with alcoholic hepatitis and cirrhosis.  Evaluate for a liver lesion.     TECHNIQUE: Routine liver MRI was performed without and with IV contrast.  Compared with CT of the abdomen from 08/30/2020.     FINDINGS: The liver is very heterogeneous in signal and is most  heterogeneous on the out of phase sequence which shows  multiple  heterogeneous regions of fatty infiltration. There is a masslike region  of fatty infiltration at the medial hepatic segment which measures  approximately 8 x 5.5 cm. The enhancement pattern is also heterogeneous.  No suspicious enhancing mass is seen. The main portal vein is patent,  but is smaller than expected. The right portal venous branches are  diminutive as well. There is recanalization of the periumbilical vein  and there are varices at the GE junction and surrounding the stomach.  The spleen measures approximately 13.5 cm in diameter. The gallbladder  is slightly contracted. There is no biliary dilatation. No abnormality  seen at the pancreas or adrenals.       Impression:       1. Very heterogeneous liver with heterogeneous fatty infiltration  throughout the liver parenchyma. There is an approximately 8 x 6 cm  masslike region of focal fatty infiltration at the medial hepatic  segment. There is no suspicious or enhancing liver mass. Please  correlate with the alpha-fetoprotein. If there is an elevation of the  alpha-fetoprotein, a follow-up liver MRI is recommended in 6 months or  sooner if indicated.  2. There is portal hypertension with varices and recanalization of the  periumbilical vein. Moderate volume of ascites. The diminutive size of  the right portal vein suggests the possibility of old thrombosis or  partial thrombosis. There is no evidence for acute thrombus within the  portal vein, splenic vein, or SMV.     This report was finalized on 9/1/2020 3:31 PM by Dr. Lu Bryson M.D.        Paracentesis [[id number]] Collected:  09/01/20 1306    Specimen:  Body Fluid Updated:  09/01/20 1309    Narrative:       ULTRASOUND-GUIDED PARACENTESIS     HISTORY: Ascites     After signed informed consent was obtained, the left lower quadrant was  prepped and draped in the usual sterile fashion. Lidocaine was used for  local anesthesia.     The paracentesis catheter was inserted into the left lower  quadrant  using ultrasound guidance. 5300 mL yellow color ascites was removed.  Sample sent to the lab.     Confirmatory images were obtained.     Patient tolerated the procedure well with no complications.       Impression:       Ultrasound-guided paracentesis as described.     This report was finalized on 9/1/2020 1:06 PM by Dr. Royce Smith M.D.       CT Abdomen Pelvis With Contrast [[id number]] Collected:  08/30/20 1416     Updated:  08/30/20 1430    Narrative:       CT ABDOMEN PELVIS W CONTRAST-     INDICATIONS: abdominal pain     TECHNIQUE: Radiation dose reduction techniques were utilized, including  automated exposure control and exposure modulation based on body size.  Enhanced  ABDOMEN AND PELVIS CT     COMPARISON: None available     FINDINGS:      Spleen is enlarged 13.3 cm.     The liver is markedly heterogeneous. Compared with the spleen, the liver  shows diffuse low-density suggesting steatosis. A heterogeneous mass  apparent centrally in the liver, 5.1 x 8.0 cm on axial image 51 shows CT  density of 30, possibility of neoplasm not excluded, multi phase  enhanced liver MRI recommended for further evaluation..     Moderate abdominal and pelvic ascites.     Assessment for inflammatory changes is limited by diffuse mesenteric  edema. Some stranding around the pancreas may relate to diffuse  mesenteric edema or could be evidence of pancreatitis.           Otherwise unremarkable appearance of the liver, spleen, adrenal glands,  pancreas, kidneys, bladder.     No bowel obstruction. Abnormal thickening of the colon wall from the  cecum to the distal transverse colon suggest nonspecific colitis.     No free intraperitoneal gas.     Scattered small mesenteric and para-aortic lymph nodes are seen that are  not significant by size criteria.     Abdominal aorta is not aneurysmal.     The lung bases show atelectasis, minimal left pleural effusion.     Degenerative changes are seen in the spine. Old left rib  fractures.             Impression:          1. Abnormal thickening of the colon wall from the cecum to the distal  transverse colon suggest nonspecific colitis.  2. The liver is markedly heterogeneous. A mass apparent centrally in the  liver, 5.1 x 8.0 cm on axial image 51 shows CT density of 30,  possibility of neoplasm not excluded, multi phase enhanced liver MRI  recommended for further evaluation.. Mild splenomegaly.     3. Moderate abdominal and pelvic ascites. Assessment for inflammatory  changes is limited by diffuse mesenteric edema. Some stranding around  the pancreas may relate to diffuse mesenteric edema or could be evidence  of pancreatitis.     Discussed by telephone with Dr. Alas at 1424, 08/30/2020.     This report was finalized on 8/30/2020 2:27 PM by Dr. Arnav Staley M.D.       XR Chest 1 View [[id number]] Collected:  08/30/20 1302     Updated:  08/30/20 1307    Narrative:       XR CHEST 1 VW-     HISTORY: Female who is 30 years-old,  pain     TECHNIQUE: Frontal view of the chest     COMPARISON: None available     FINDINGS: The size of the cardiac silhouette appears borderline could  reflect borderline heart size and/or pericardial effusion. Pulmonary  vasculature is unremarkable. Small left basilar atelectasis. No pleural  effusion, or pneumothorax. Calcified nodule projecting at the right lung  suggests old granulomatous disease. No acute osseous process.       Impression:       Small left basilar atelectasis. Borderline size of the  cardiac silhouette.       This report was finalized on 8/30/2020 1:04 PM by Dr. Arnav Staley M.D.             Pertinent Labs     Results from last 7 days   Lab Units 09/02/20  0824 09/01/20  0835 08/30/20  1219   WBC 10*3/mm3 7.85 10.49 11.85*   HEMOGLOBIN g/dL 10.5* 10.5* 11.1*   PLATELETS 10*3/mm3 245 273 317     Results from last 7 days   Lab Units 09/02/20  0824 09/01/20  0835 08/30/20  1219   SODIUM mmol/L 136 134* 132*   POTASSIUM mmol/L 4.3  3.3* 3.1*   CHLORIDE mmol/L 101 97* 94*   CO2 mmol/L 26.0 26.5 22.2   BUN mg/dL 6 4* 7   CREATININE mg/dL 0.49* 0.43* 0.59   GLUCOSE mg/dL 101* 121* 122*   Estimated Creatinine Clearance: 186.6 mL/min (A) (by C-G formula based on SCr of 0.49 mg/dL (L)).  Results from last 7 days   Lab Units 09/01/20  0835 08/30/20  1219   ALBUMIN g/dL 3.30* 3.70   BILIRUBIN mg/dL 3.9* 5.8*   ALK PHOS U/L 166* 184*   AST (SGOT) U/L 79* 99*   ALT (SGPT) U/L 46* 53*     Results from last 7 days   Lab Units 09/02/20  0824 09/01/20  0835 08/30/20  1219   CALCIUM mg/dL 9.0 8.5* 9.1   ALBUMIN g/dL  --  3.30* 3.70   MAGNESIUM mg/dL  --   --  1.7     Results from last 7 days   Lab Units 08/30/20  1219   LIPASE U/L 51             Invalid input(s): LDLCALC  Results from last 7 days   Lab Units 08/31/20  1615 08/30/20  1507 08/30/20  1456   BLOODCX   --  No growth at 2 days No growth at 2 days   BODYFLDCX  No growth at 2 days  --   --      Imaging Results (Last 24 Hours)     Procedure Component Value Units Date/Time    MRI Abdomen With & Without Contrast [[id number]] Collected:  09/01/20 0855     Updated:  09/01/20 1534    Narrative:       MRI ABDOMEN WITH/WITHOUT CONTRAST-     HISTORY: 30-year-old female with alcoholic hepatitis and cirrhosis.  Evaluate for a liver lesion.     TECHNIQUE: Routine liver MRI was performed without and with IV contrast.  Compared with CT of the abdomen from 08/30/2020.     FINDINGS: The liver is very heterogeneous in signal and is most  heterogeneous on the out of phase sequence which shows multiple  heterogeneous regions of fatty infiltration. There is a masslike region  of fatty infiltration at the medial hepatic segment which measures  approximately 8 x 5.5 cm. The enhancement pattern is also heterogeneous.  No suspicious enhancing mass is seen. The main portal vein is patent,  but is smaller than expected. The right portal venous branches are  diminutive as well. There is recanalization of the periumbilical  vein  and there are varices at the GE junction and surrounding the stomach.  The spleen measures approximately 13.5 cm in diameter. The gallbladder  is slightly contracted. There is no biliary dilatation. No abnormality  seen at the pancreas or adrenals.       Impression:       1. Very heterogeneous liver with heterogeneous fatty infiltration  throughout the liver parenchyma. There is an approximately 8 x 6 cm  masslike region of focal fatty infiltration at the medial hepatic  segment. There is no suspicious or enhancing liver mass. Please  correlate with the alpha-fetoprotein. If there is an elevation of the  alpha-fetoprotein, a follow-up liver MRI is recommended in 6 months or  sooner if indicated.  2. There is portal hypertension with varices and recanalization of the  periumbilical vein. Moderate volume of ascites. The diminutive size of  the right portal vein suggests the possibility of old thrombosis or  partial thrombosis. There is no evidence for acute thrombus within the  portal vein, splenic vein, or SMV.     This report was finalized on 9/1/2020 3:31 PM by Dr. Lu Bryson M.D.        Paracentesis [[id number]] Collected:  09/01/20 1306    Specimen:  Body Fluid Updated:  09/01/20 1309    Narrative:       ULTRASOUND-GUIDED PARACENTESIS     HISTORY: Ascites     After signed informed consent was obtained, the left lower quadrant was  prepped and draped in the usual sterile fashion. Lidocaine was used for  local anesthesia.     The paracentesis catheter was inserted into the left lower quadrant  using ultrasound guidance. 5300 mL yellow color ascites was removed.  Sample sent to the lab.     Confirmatory images were obtained.     Patient tolerated the procedure well with no complications.       Impression:       Ultrasound-guided paracentesis as described.     This report was finalized on 9/1/2020 1:06 PM by Dr. Royce Smith M.D.             Test Results Pending at Discharge      Order Current Status     Anaerobic Culture - Body Fluid, Peritoneum In process    Blood Culture - Blood, Arm, Left Preliminary result    Blood Culture - Blood, Arm, Left Preliminary result    Body Fluid Culture - Body Fluid, Peritoneum Preliminary result            Discharge Exam   Physical Exam   Patient is afebrile with stable vital signs  General.  Alert oriented x3 no apparent pain distress or diaphoresis normal mood and affect  Eyes.  Pupils equal round and reactive intact extraocular musculature no pallor no jaundice normal conjunctivae and lids  ENT.  Moist mucous membrane no throat lesions or exudates  Neck.  Supple no JVD no lymphadenopathy  Chest.  Clear to auscultation bilaterally with no added sounds  Cardiovascular.  Regular rate and rhythm no gallops or murmur  Abdomen.  Mildly distended soft lax no tenderness no organomegaly no guarding or rebound normal bowel sounds positive small to moderate ascites  Extremities.  No clubbing cyanosis or edema  CNS.  No acute focal neurological deficits      Discharge Details        Discharge Medications      New Medications      Instructions Start Date   potassium chloride 10 MEQ CR capsule  Commonly known as:  MICRO-K   20 mEq, Oral, 3 Times Daily With Meals         Continue These Medications      Instructions Start Date   busPIRone 5 MG tablet  Commonly known as:  BUSPAR   5 mg, Oral, 3 Times Daily      cyclobenzaprine 10 MG tablet  Commonly known as:  FLEXERIL   10 mg, Oral, 3 Times Daily PRN      diphenhydrAMINE 25 mg capsule  Commonly known as:  BENADRYL   25 mg, Oral, Every 6 Hours PRN      folic acid 1 MG tablet  Commonly known as:  FOLVITE   1 mg, Oral, Daily      furosemide 40 MG tablet  Commonly known as:  LASIX   40 mg, Oral, Daily      melatonin 5 MG tablet tablet   5 mg, Oral      multivitamin with minerals tablet tablet   1 tablet, Oral, Daily      pantoprazole 40 MG EC tablet  Commonly known as:  PROTONIX   40 mg, Oral, Daily      PARoxetine 10 MG tablet  Commonly known as:   PAXIL   10 mg, Oral, Every Morning      pentoxifylline 400 MG CR tablet  Commonly known as:  TRENtal   400 mg, Oral, 3 Times Daily With Meals      PROBIOTIC DAILY PO   Oral      spironolactone 100 MG tablet  Commonly known as:  ALDACTONE   100 mg, Oral, Daily      Zinc 50 MG capsule   Oral             No Known Allergies      Discharge Disposition:  Condition:     Diet:   Diet Order   Procedures   • Diet Regular; Low Sodium; 2,000 mg Na       Activity:   Activity Instructions     Activity as Tolerated            Counseling :    CODE STATUS:    Code Status and Medical Interventions:   Ordered at: 08/30/20 9717     Level Of Support Discussed With:    Patient     Code Status:    CPR     Medical Interventions (Level of Support Prior to Arrest):    Full       No future appointments.  Additional Instructions for the Follow-ups that You Need to Schedule     Call MD With Problems / Concerns   As directed      Instructions: Return to emergency department will call MD if increasing abdominal pain/nausea or vomiting/shortness of breath/palpitations/chest pain    Order Comments:  Instructions: Return to emergency department will call MD if increasing abdominal pain/nausea or vomiting/shortness of breath/palpitations/chest pain          Discharge Follow-up with PCP   As directed       Currently Documented PCP:    Shala Garcia APRN    PCP Phone Number:    [phone]     Follow Up Details:  Primary MD in 1 week for liver cirrhosis/ascites         Discharge Follow-up with Specified Provider: GI at the [facility] as previously scheduled; 2 Weeks   As directed      To:  GI at the [facility] as previously scheduled    Follow Up:  2 Weeks    Follow Up Details:  Liver cirrhosis/ascites         Discharge Follow-up with Specified Provider: Hematology oncology at [facility] as scheduled; 1 Month   As directed      To:  Hematology oncology at [facility] at  the [facility] as scheduled    Follow Up:  1 Month    Follow Up Details:  Liver mass           Follow-up Information     Shala Garcia, APRN .    Specialty:  Family Medicine  Why:  Primary MD in 1 week for liver cirrhosis/ascites  Contact information:  [address]  [address]  [place] [address] [phone]                     Time Spent on Discharge:  Greater than 30 minutes      Marianne Rm MD  [facility]  09/02/20  11:23 AM    Electronically signed by Marianne Rm MD at 09/02/20 1124         Catrachita Ruiz, RN      Case Management   Progress Notes   Signed   Date of Service:  09/01/20 1428   Creation Time:  09/01/20 1428            Signed             Show:Clear all  []Manual[x]Template[]Copied    Added by:  [x]Catrachita Ruiz, RN    []Mervat for details  Discharge Planning Assessment  [facility]     Patient Name: Nayeli Bose                       MRN: [id number]  Today's Date: 9/1/2020                       Admit Date: 8/30/2020          Discharge Needs Assessment      Row Name 09/01/20 1424           Living Environment     Lives With  child(sd), adult     Name(s) of Who Lives With Patient  lives with significant other and 2 children, ages 4 &7     Current Living Arrangements  home/apartment/condo     Primary Care Provided by  self     Provides Primary Care For  no one     Family Caregiver if Needed  none     Quality of Family Relationships  helpful;involved;supportive     Able to Return to Prior Arrangements  yes           Resource/Environmental Concerns     Resource/Environmental Concerns  none     Transportation Concerns  car, none           Transition Planning     Patient/Family Anticipates Transition to  home with family     Patient/Family Anticipated Services at Transition  none     Transportation Anticipated  family or friend will provide           Discharge Needs Assessment     Readmission Within the Last 30 Days   no previous admission in last 30 days     Concerns to be Addressed  no discharge needs identified     Equipment Currently Used at Home  none     Anticipated Changes Related to Illness  none               Discharge Plan      Row Name 09/01/20 1421           Plan     Plan  return home- CCP will follow as needed     Patient/Family in Agreement with Plan  yes     Plan Comments  Spoke with patient at bedside.  Facesheet, PCP and pharmacy verified.  Patient lives in a trailer with her significant other and 2 children, ages 4 & 7.  She is IADLS and does not use any DME.  She does not have a HH or SNF history.  She denies any needs and plans to return home.  CCP will follow as needed. Catrachita Ruiz RN              Destination       Coordination has not been started for this encounter.               Durable Medical Equipment       Coordination has not been started for this encounter.               Dialysis/Infusion       Coordination has not been started for this encounter.               Home Medical Care       Coordination has not been started for this encounter.               Therapy       Coordination has not been started for this encounter.               Community Resources       Coordination has not been started for this encounter.                  Demographic Summary      Row Name 09/01/20 1424           General Information     Admission Type  inpatient     Arrived From  emergency department     Referral Source  admission list     Reason for Consult  discharge planning     Preferred Language  English               Functional Status      Row Name 09/01/20 1424           Functional Status     Usual Activity Tolerance  good     Current Activity Tolerance  good           Functional Status, IADL     Medications  independent     Meal Preparation  independent     Housekeeping  independent     Laundry  independent     Shopping  independent           Mental Status     General Appearance WDL  WDL           Mental Status  Summary     Recent Changes in Mental Status/Cognitive Functioning  no changes          Psychosocial    No documentation.         Abuse/Neglect    No documentation.         Legal    No documentation.         Substance Abuse    No documentation.         Patient Forms    No documentation.               Catrachita Ruiz RN

## 2020-09-02 NOTE — NURSING NOTE
"Spoke to Deanna at GI office per Dr. Caba to put \"Overbook with Liz\" for an appt next week with pt.  She said that she would speak to the  to get this done. Will call me back if any questions.  "

## 2020-09-02 NOTE — PLAN OF CARE
Problem: Patient Care Overview  Goal: Plan of Care Review  Outcome: Ongoing (interventions implemented as appropriate)  Flowsheets (Taken 9/2/2020 0447)  Progress: no change  Plan of Care Reviewed With: patient  Outcome Summary: Patient is alert and oriented. VSS. Medicated for pain and nausea PRN. IV albumin given last night, tolerated well. Up ad edwina. Tolerating diet. So signs of distress. Will continue to monitor closely.     Problem: Fall Risk (Adult)  Goal: Absence of Fall  Outcome: Ongoing (interventions implemented as appropriate)  Flowsheets (Taken 9/2/2020 0447)  Absence of Fall: making progress toward outcome     Problem: Paracentesis, Abdominal (Adult)  Goal: Signs and Symptoms of Listed Potential Problems Will be Absent, Minimized or Managed (Paracentesis, Abdominal)  Outcome: Ongoing (interventions implemented as appropriate)  Flowsheets (Taken 9/2/2020 0447)  Problems Assessed (Paracentesis): all  Problems Present (Paracentesis): none     Problem: Pain, Acute (Adult)  Goal: Acceptable Pain Control/Comfort Level  Outcome: Ongoing (interventions implemented as appropriate)  Flowsheets (Taken 9/2/2020 0447)  Acceptable Pain Control/Comfort Level: making progress toward outcome

## 2020-09-02 NOTE — H&P (VIEW-ONLY)
Patient discharge was placed on hold from yesterday per GI request.  Subjective.  Patient reports occasional abdominal pain.  No change in abdominal distention.  No nausea or vomiting no fever chills normal bowel habits tolerating p.o. no chest pain no shortness of breath no cough he has no hemoptysis  Objective.  Patient is afebrile with stable vital signs  General.  Alert oriented x3 no apparent pain distress or diaphoresis normal mood and affect  Eyes.  Pupils equal round and reactive intact extraocular musculature no pallor no jaundice normal conjunctivae and lids  ENT.  Moist mucous membrane no throat lesions or exudates  Neck.  Supple no JVD no lymphadenopathy  Chest.  Clear to auscultation bilaterally with no added sounds  Cardiovascular.  Regular rate and rhythm no gallops or murmur  Abdomen.  Mildly distended soft lax no tenderness no organomegaly no guarding or rebound normal bowel sounds positive small to moderate ascites  Extremities.  No clubbing cyanosis or edema  CNS.  No acute focal neurological deficits  Diagnostics and labs all noted including an MRI that revealed no discrete liver mass.  Patient will be seeing the dietitian today to instruct her about low-sodium diet and hepatic diet.  Patient chooses to follow-up with [facility] GI service and this is being arranged by GI as they anticipate repeat paracentesis soon.  GI okayed the discharge.  These look at the updated discharge summary.

## 2020-09-02 NOTE — DISCHARGE SUMMARY
Patient Name: Nayeli Bose  : 1990  MRN: [id number]    Date of Admission: 2020  Date of Discharge:  2020  Primary Care Physician: Shala Garcia, APRN      Discharge Diagnoses     Active Hospital Problems    Diagnosis  POA   • **Ascites [R18.8]  Yes   • Cirrhosis of liver (CMS/HCC) [K74.60]  Yes   • Hypokalemia [E87.6]  Yes   • Hyponatremia [E87.1]  Yes   • Anemia, chronic disease [D63.8]  Yes   • Anxiety and depression [F41.9, F32.9]  Yes      Resolved Hospital Problems    Diagnosis Date Resolved POA   • Generalized abdominal pain [R10.84] 2020 Yes   • Fever [R50.9] 2020 Yes   • Leukocytosis [D72.829] 2020 Yes        Hospital Course   Brief admission history and physical.  These refer to the H&P for full details.  A 30 years old white female recently diagnosed with liver cirrhosis and portal hypertension because of hemochromatosis and possibly alcohol.  She was recently discharged from [facility].  She presented to the hospital because of increasing abdominal distention and abdominal pain.  No nausea no vomiting no diarrhea no constipation no bleeding per rectum no melena.  There was positive exertional dyspnea.  Her physical examination was remarkable for temperature 98.4 a heart rate of 108 respiratory of 16 and blood pressure 120/75 and O2 sats of 93% on room air.  The rest of the examination is remarkable for tinge of jaundice in the sclera mild tachycardia with a grade 2 systolic murmur positive abdominal ascites and distention.  Hospital course she will ER evaluation included CMP that was normal except a blood sugar of 122 sodium 132 potassium 3.1 chloride 94 ALT 53 AST 99 alkaline phosphatase 184.  INR 1.3 PTT 36.5.  Lipase was normal.  Magnesium was normal.  hCG was negative.  Ethanol was negative.  CBC was normal except a hemoglobin of 11.1 white count 11.8.  Pro-Jase was elevated at 2.46.  UA was negative for UTI.  Lactate was elevated at 3.  CT scan  of the abdomen and pelvis with IV contrast revealed normal thickening of the colon that could suggest nonspecific colitis.  Liver markedly heterogenous with a possible neoplastic process.  Moderate abdominal pelvic ascites.  Chest x-ray revealed small left basilar atelectasis.  The patient was admitted with a diagnosis of liver cirrhosis/portal hypertension/ascites with a previous work-up at the [facility] including a liver biopsy suggestive of hemochromatosis as a cause possibly alcohol.  Therapeutic and diagnostic paracentesis was performed she was empirically started on IV diuresis and GI was consulted she was empirically started on Rocephin peritoneal fluid revealed no infection and the Rocephin was stopped GI switched the patient back to p.o. diuretics and they have cleared her by the time of discharge after paracentesis the patient felt much better with decrease of the abdominal distention and abdominal pain she was tolerating p.o. well.  She was maintained on her Lasix/Aldactone/Trental her liver function tests remained stable and actually was trending down.. Patient had an MRI of her abdomen to evaluate for the liver mass there was no discrete liver mass and mostly at dense collection of patchy infiltrates.  Cultures were negative by the time of discharge.  She was noted initially to have low-grade fever and mild leukocytosis chest x-ray and UA were negative for infection there was no evidence of spontaneous bacterial peritonitis by the fluid with negative cultures and no organisms seen by the Gram stain.  Her low-grade fever and leukocytosis has resolved by the time of discharge.  She was noted to have hyperglycemia A1c was normal.  She was noted to have hypokalemia and this was substituted prior to discharge and magnesium was normal.  Her hyponatremia has improved.  She was noticed to be anemic anemia work-up revealed anemia of chronic disease.  She has a history of anxiety and depression  continue BuSpar and Paxil.  She was asked to follow-up with the primary care physician in 1 week for monitoring of her chemistries and potassium.  Also to keep her follow-up with the GI clinic at [facility] and hematology oncology clinic at [facility] for the hemochromatosis and the possible liver mass.  Patient was hemodynamically stable and tolerating p.o. at the time of discharge.  GI has okayed the discharge and they will arrange a follow-up with them for a paracentesis soon.  Nutritionist will see the patient prior to discharge to indicate her about the the hepatic diet and low-sodium diet..         Consultants     Consult Orders (all) (From admission, onward)     Start     Ordered    09/02/20 0824  Inpatient Nutrition Consult  Once     Comments:  Low Sodium diet.  Pt will be getting cont paracentesis and needs to be educated on sodium intake.  Thank you.   Provider:  (Not yet assigned)    09/02/20 0826    08/30/20 1701  Inpatient Gastroenterology Consult  Once     Specialty:  Gastroenterology  Provider:  Clayton Rooney MD    08/30/20 1712    08/30/20 1701  Inpatient Case Management  Consult  Once     Provider:  (Not yet assigned)    08/30/20 1712    08/30/20 1429  [facility] (on-call MD unless specified) Details  Once     Specialty:  Hospitalist  Provider:  Marianne Rm MD    08/30/20 1428              Procedures     Imaging Results (All)     Procedure Component Value Units Date/Time    MRI Abdomen With & Without Contrast [[id number]] Collected:  09/01/20 0855     Updated:  09/01/20 1534    Narrative:       MRI ABDOMEN WITH/WITHOUT CONTRAST-     HISTORY: 30-year-old female with alcoholic hepatitis and cirrhosis.  Evaluate for a liver lesion.     TECHNIQUE: Routine liver MRI was performed without and with IV contrast.  Compared with CT of the abdomen from 08/30/2020.     FINDINGS: The liver is very heterogeneous in signal and is most  heterogeneous on the out of  phase sequence which shows multiple  heterogeneous regions of fatty infiltration. There is a masslike region  of fatty infiltration at the medial hepatic segment which measures  approximately 8 x 5.5 cm. The enhancement pattern is also heterogeneous.  No suspicious enhancing mass is seen. The main portal vein is patent,  but is smaller than expected. The right portal venous branches are  diminutive as well. There is recanalization of the periumbilical vein  and there are varices at the GE junction and surrounding the stomach.  The spleen measures approximately 13.5 cm in diameter. The gallbladder  is slightly contracted. There is no biliary dilatation. No abnormality  seen at the pancreas or adrenals.       Impression:       1. Very heterogeneous liver with heterogeneous fatty infiltration  throughout the liver parenchyma. There is an approximately 8 x 6 cm  masslike region of focal fatty infiltration at the medial hepatic  segment. There is no suspicious or enhancing liver mass. Please  correlate with the alpha-fetoprotein. If there is an elevation of the  alpha-fetoprotein, a follow-up liver MRI is recommended in 6 months or  sooner if indicated.  2. There is portal hypertension with varices and recanalization of the  periumbilical vein. Moderate volume of ascites. The diminutive size of  the right portal vein suggests the possibility of old thrombosis or  partial thrombosis. There is no evidence for acute thrombus within the  portal vein, splenic vein, or SMV.     This report was finalized on 9/1/2020 3:31 PM by Dr. Lu Bryson M.D.        Paracentesis [[id number]] Collected:  09/01/20 1306    Specimen:  Body Fluid Updated:  09/01/20 1309    Narrative:       ULTRASOUND-GUIDED PARACENTESIS     HISTORY: Ascites     After signed informed consent was obtained, the left lower quadrant was  prepped and draped in the usual sterile fashion. Lidocaine was used for  local anesthesia.     The paracentesis catheter was  inserted into the left lower quadrant  using ultrasound guidance. 5300 mL yellow color ascites was removed.  Sample sent to the lab.     Confirmatory images were obtained.     Patient tolerated the procedure well with no complications.       Impression:       Ultrasound-guided paracentesis as described.     This report was finalized on 9/1/2020 1:06 PM by Dr. Royce Smith M.D.       CT Abdomen Pelvis With Contrast [[id number]] Collected:  08/30/20 1416     Updated:  08/30/20 1430    Narrative:       CT ABDOMEN PELVIS W CONTRAST-     INDICATIONS: abdominal pain     TECHNIQUE: Radiation dose reduction techniques were utilized, including  automated exposure control and exposure modulation based on body size.  Enhanced  ABDOMEN AND PELVIS CT     COMPARISON: None available     FINDINGS:      Spleen is enlarged 13.3 cm.     The liver is markedly heterogeneous. Compared with the spleen, the liver  shows diffuse low-density suggesting steatosis. A heterogeneous mass  apparent centrally in the liver, 5.1 x 8.0 cm on axial image 51 shows CT  density of 30, possibility of neoplasm not excluded, multi phase  enhanced liver MRI recommended for further evaluation..     Moderate abdominal and pelvic ascites.     Assessment for inflammatory changes is limited by diffuse mesenteric  edema. Some stranding around the pancreas may relate to diffuse  mesenteric edema or could be evidence of pancreatitis.           Otherwise unremarkable appearance of the liver, spleen, adrenal glands,  pancreas, kidneys, bladder.     No bowel obstruction. Abnormal thickening of the colon wall from the  cecum to the distal transverse colon suggest nonspecific colitis.     No free intraperitoneal gas.     Scattered small mesenteric and para-aortic lymph nodes are seen that are  not significant by size criteria.     Abdominal aorta is not aneurysmal.     The lung bases show atelectasis, minimal left pleural effusion.     Degenerative changes are seen in  the spine. Old left rib fractures.             Impression:          1. Abnormal thickening of the colon wall from the cecum to the distal  transverse colon suggest nonspecific colitis.  2. The liver is markedly heterogeneous. A mass apparent centrally in the  liver, 5.1 x 8.0 cm on axial image 51 shows CT density of 30,  possibility of neoplasm not excluded, multi phase enhanced liver MRI  recommended for further evaluation.. Mild splenomegaly.     3. Moderate abdominal and pelvic ascites. Assessment for inflammatory  changes is limited by diffuse mesenteric edema. Some stranding around  the pancreas may relate to diffuse mesenteric edema or could be evidence  of pancreatitis.     Discussed by telephone with Dr. Alas at 1424, 08/30/2020.     This report was finalized on 8/30/2020 2:27 PM by Dr. Arnav Staley M.D.       XR Chest 1 View [[id number]] Collected:  08/30/20 1302     Updated:  08/30/20 1307    Narrative:       XR CHEST 1 VW-     HISTORY: Female who is 30 years-old,  pain     TECHNIQUE: Frontal view of the chest     COMPARISON: None available     FINDINGS: The size of the cardiac silhouette appears borderline could  reflect borderline heart size and/or pericardial effusion. Pulmonary  vasculature is unremarkable. Small left basilar atelectasis. No pleural  effusion, or pneumothorax. Calcified nodule projecting at the right lung  suggests old granulomatous disease. No acute osseous process.       Impression:       Small left basilar atelectasis. Borderline size of the  cardiac silhouette.       This report was finalized on 8/30/2020 1:04 PM by Dr. Arnav Staley M.D.             Pertinent Labs     Results from last 7 days   Lab Units 09/02/20  0824 09/01/20  0835 08/30/20  1219   WBC 10*3/mm3 7.85 10.49 11.85*   HEMOGLOBIN g/dL 10.5* 10.5* 11.1*   PLATELETS 10*3/mm3 245 720 317     Results from last 7 days   Lab Units 09/02/20  0824 09/01/20  0835 08/30/20  1219   SODIUM mmol/L 136 134* 132*    POTASSIUM mmol/L 4.3 3.3* 3.1*   CHLORIDE mmol/L 101 97* 94*   CO2 mmol/L 26.0 26.5 22.2   BUN mg/dL 6 4* 7   CREATININE mg/dL 0.49* 0.43* 0.59   GLUCOSE mg/dL 101* 121* 122*   Estimated Creatinine Clearance: 186.6 mL/min (A) (by C-G formula based on SCr of 0.49 mg/dL (L)).  Results from last 7 days   Lab Units 09/01/20  0835 08/30/20  1219   ALBUMIN g/dL 3.30* 3.70   BILIRUBIN mg/dL 3.9* 5.8*   ALK PHOS U/L 166* 184*   AST (SGOT) U/L 79* 99*   ALT (SGPT) U/L 46* 53*     Results from last 7 days   Lab Units 09/02/20  0824 09/01/20  0835 08/30/20  1219   CALCIUM mg/dL 9.0 8.5* 9.1   ALBUMIN g/dL  --  3.30* 3.70   MAGNESIUM mg/dL  --   --  1.7     Results from last 7 days   Lab Units 08/30/20  1219   LIPASE U/L 51             Invalid input(s): LDLCALC  Results from last 7 days   Lab Units 08/31/20  1615 08/30/20  1507 08/30/20  1456   BLOODCX   --  No growth at 2 days No growth at 2 days   BODYFLDCX  No growth at 2 days  --   --      Imaging Results (Last 24 Hours)     Procedure Component Value Units Date/Time    MRI Abdomen With & Without Contrast [[id number]] Collected:  09/01/20 0855     Updated:  09/01/20 1534    Narrative:       MRI ABDOMEN WITH/WITHOUT CONTRAST-     HISTORY: 30-year-old female with alcoholic hepatitis and cirrhosis.  Evaluate for a liver lesion.     TECHNIQUE: Routine liver MRI was performed without and with IV contrast.  Compared with CT of the abdomen from 08/30/2020.     FINDINGS: The liver is very heterogeneous in signal and is most  heterogeneous on the out of phase sequence which shows multiple  heterogeneous regions of fatty infiltration. There is a masslike region  of fatty infiltration at the medial hepatic segment which measures  approximately 8 x 5.5 cm. The enhancement pattern is also heterogeneous.  No suspicious enhancing mass is seen. The main portal vein is patent,  but is smaller than expected. The right portal venous branches are  diminutive as well. There is recanalization of  the periumbilical vein  and there are varices at the GE junction and surrounding the stomach.  The spleen measures approximately 13.5 cm in diameter. The gallbladder  is slightly contracted. There is no biliary dilatation. No abnormality  seen at the pancreas or adrenals.       Impression:       1. Very heterogeneous liver with heterogeneous fatty infiltration  throughout the liver parenchyma. There is an approximately 8 x 6 cm  masslike region of focal fatty infiltration at the medial hepatic  segment. There is no suspicious or enhancing liver mass. Please  correlate with the alpha-fetoprotein. If there is an elevation of the  alpha-fetoprotein, a follow-up liver MRI is recommended in 6 months or  sooner if indicated.  2. There is portal hypertension with varices and recanalization of the  periumbilical vein. Moderate volume of ascites. The diminutive size of  the right portal vein suggests the possibility of old thrombosis or  partial thrombosis. There is no evidence for acute thrombus within the  portal vein, splenic vein, or SMV.     This report was finalized on 9/1/2020 3:31 PM by Dr. Lu Bryson M.D.        Paracentesis [[id number]] Collected:  09/01/20 1306    Specimen:  Body Fluid Updated:  09/01/20 1309    Narrative:       ULTRASOUND-GUIDED PARACENTESIS     HISTORY: Ascites     After signed informed consent was obtained, the left lower quadrant was  prepped and draped in the usual sterile fashion. Lidocaine was used for  local anesthesia.     The paracentesis catheter was inserted into the left lower quadrant  using ultrasound guidance. 5300 mL yellow color ascites was removed.  Sample sent to the lab.     Confirmatory images were obtained.     Patient tolerated the procedure well with no complications.       Impression:       Ultrasound-guided paracentesis as described.     This report was finalized on 9/1/2020 1:06 PM by Dr. Royce Smith M.D.             Test Results Pending at Discharge      Order  Current Status    Anaerobic Culture - Body Fluid, Peritoneum In process    Blood Culture - Blood, Arm, Left Preliminary result    Blood Culture - Blood, Arm, Left Preliminary result    Body Fluid Culture - Body Fluid, Peritoneum Preliminary result            Discharge Exam   Physical Exam   Patient is afebrile with stable vital signs  General.  Alert oriented x3 no apparent pain distress or diaphoresis normal mood and affect  Eyes.  Pupils equal round and reactive intact extraocular musculature no pallor no jaundice normal conjunctivae and lids  ENT.  Moist mucous membrane no throat lesions or exudates  Neck.  Supple no JVD no lymphadenopathy  Chest.  Clear to auscultation bilaterally with no added sounds  Cardiovascular.  Regular rate and rhythm no gallops or murmur  Abdomen.  Mildly distended soft lax no tenderness no organomegaly no guarding or rebound normal bowel sounds positive small to moderate ascites  Extremities.  No clubbing cyanosis or edema  CNS.  No acute focal neurological deficits      Discharge Details        Discharge Medications      New Medications      Instructions Start Date   potassium chloride 10 MEQ CR capsule  Commonly known as:  MICRO-K   20 mEq, Oral, 3 Times Daily With Meals         Continue These Medications      Instructions Start Date   busPIRone 5 MG tablet  Commonly known as:  BUSPAR   5 mg, Oral, 3 Times Daily      cyclobenzaprine 10 MG tablet  Commonly known as:  FLEXERIL   10 mg, Oral, 3 Times Daily PRN      diphenhydrAMINE 25 mg capsule  Commonly known as:  BENADRYL   25 mg, Oral, Every 6 Hours PRN      folic acid 1 MG tablet  Commonly known as:  FOLVITE   1 mg, Oral, Daily      furosemide 40 MG tablet  Commonly known as:  LASIX   40 mg, Oral, Daily      melatonin 5 MG tablet tablet   5 mg, Oral      multivitamin with minerals tablet tablet   1 tablet, Oral, Daily      pantoprazole 40 MG EC tablet  Commonly known as:  PROTONIX   40 mg, Oral, Daily      PARoxetine 10 MG  tablet  Commonly known as:  PAXIL   10 mg, Oral, Every Morning      pentoxifylline 400 MG CR tablet  Commonly known as:  TRENtal   400 mg, Oral, 3 Times Daily With Meals      PROBIOTIC DAILY PO   Oral      spironolactone 100 MG tablet  Commonly known as:  ALDACTONE   100 mg, Oral, Daily      Zinc 50 MG capsule   Oral             No Known Allergies      Discharge Disposition:  Condition:     Diet:   Diet Order   Procedures   • Diet Regular; Low Sodium; 2,000 mg Na       Activity:   Activity Instructions     Activity as Tolerated            Counseling :    CODE STATUS:    Code Status and Medical Interventions:   Ordered at: 08/30/20 3269     Level Of Support Discussed With:    Patient     Code Status:    CPR     Medical Interventions (Level of Support Prior to Arrest):    Full       No future appointments.  Additional Instructions for the Follow-ups that You Need to Schedule     Call MD With Problems / Concerns   As directed      Instructions: Return to emergency department will call MD if increasing abdominal pain/nausea or vomiting/shortness of breath/palpitations/chest pain    Order Comments:  Instructions: Return to emergency department will call MD if increasing abdominal pain/nausea or vomiting/shortness of breath/palpitations/chest pain          Discharge Follow-up with PCP   As directed       Currently Documented PCP:    Shala Garcia APRN    PCP Phone Number:    [phone]     Follow Up Details:  Primary MD in 1 week for liver cirrhosis/ascites         Discharge Follow-up with Specified Provider: GI at the [facility] as previously scheduled; 2 Weeks   As directed      To:  GI at the [facility] as previously scheduled    Follow Up:  2 Weeks    Follow Up Details:  Liver cirrhosis/ascites         Discharge Follow-up with Specified Provider: Hematology oncology at [facility] at the [facility] as scheduled; 1 Month   As directed      To:  Hematology  oncology at [facility] center at the [facility] as scheduled    Follow Up:  1 Month    Follow Up Details:  Liver mass           Follow-up Information     Shala Garcia, APRN .    Specialty:  Family Medicine  Why:  Primary MD in 1 week for liver cirrhosis/ascites  Contact information:  [address] [id number] [phone]                     Time Spent on Discharge:  Greater than 30 minutes      Marianne Rm MD  [facility]  09/02/20  11:23 AM

## 2020-09-03 ENCOUNTER — TELEPHONE (OUTPATIENT)
Dept: GASTROENTEROLOGY | Facility: CLINIC | Age: 30
End: 2020-09-03

## 2020-09-03 ENCOUNTER — READMISSION MANAGEMENT (OUTPATIENT)
Dept: CALL CENTER | Facility: HOSPITAL | Age: 30
End: 2020-09-03

## 2020-09-03 DIAGNOSIS — K74.69 OTHER CIRRHOSIS OF LIVER (HCC): Primary | ICD-10-CM

## 2020-09-03 DIAGNOSIS — R18.8 OTHER ASCITES: ICD-10-CM

## 2020-09-03 RX ORDER — ALBUMIN (HUMAN) 12.5 G/50ML
25 SOLUTION INTRAVENOUS ONCE
Status: CANCELLED | OUTPATIENT
Start: 2020-09-10 | End: 2020-09-03

## 2020-09-03 NOTE — OUTREACH NOTE
Prep Survey      Responses   [facility] facility patient discharged from?  [place]   Is LACE score < 7 ?  No   Eligibility  Readm Mgmt   Discharge diagnosis  **Ascites    Does the patient have one of the following disease processes/diagnoses(primary or secondary)?  Other   Does the patient have Home health ordered?  No   Is there a DME ordered?  No   Prep survey completed?  Yes          Ange Castillo RN

## 2020-09-03 NOTE — PROGRESS NOTES
Case Management Discharge Note      Final Note: Discharge home with family         Destination      No service has been selected for the patient.      Durable Medical Equipment      No service has been selected for the patient.      Dialysis/Infusion      No service has been selected for the patient.      Home Medical Care      No service has been selected for the patient.      Therapy      No service has been selected for the patient.      Community Resources      No service has been selected for the patient.        Transportation Services  Private: Car    Final Discharge Disposition Code: 01 - home or self-care

## 2020-09-03 NOTE — TELEPHONE ENCOUNTER
----- Message from Katya Del Valle sent at 9/3/2020  9:49 AM EDT -----  Contact: [phone]  PT SAID SHE IS HAVING A PROCEDURE NEXT WEEK AND WANTS TO KNOW IF ANY WAY SHE CAN BE PUT IN FOR TOMORROW INSTEAD

## 2020-09-03 NOTE — TELEPHONE ENCOUNTER
"Called pt and pt reports that she thought on 09/08 she was to have another paracentesis.  ADvised pt that on 09/08 she has an appt with Kylah CORNEJO as a follow up.  Pt reports that she had her last paracentesis on Monday and her abdomen has already \"gotten bigger and is very painful\".  She states it hurts to  and she does get short of breath upon exertion.    Pt has not been weighing her self and does not know her current weight.  Asked pt is she was to f/u with [facility] and she states that the MD at [facility] told her to f/u with us.  Advised pt will send message to Dr Luo and in the meantime if she feels like her symptoms have worsened to go to ER.  Pt verb understanding.   "

## 2020-09-03 NOTE — TELEPHONE ENCOUNTER
I placed the request for the paracentesis    Yes to follow-up with me but she is actually going to need to follow-up with the liver specialist at [facility] as she had been scheduled for previously

## 2020-09-04 ENCOUNTER — READMISSION MANAGEMENT (OUTPATIENT)
Dept: CALL CENTER | Facility: HOSPITAL | Age: 30
End: 2020-09-04

## 2020-09-04 LAB
BACTERIA SPEC AEROBE CULT: NORMAL
BACTERIA SPEC AEROBE CULT: NORMAL

## 2020-09-04 NOTE — TELEPHONE ENCOUNTER
Called pt and pt reports that she has her paracentesis scheduled for 09/10 .  Pt states she follows up with Kylah on 09/08 and states she would prefer to stay in the [facility] system and not go back to .  Advised will update Dr Luo.

## 2020-09-04 NOTE — OUTREACH NOTE
"Medical Week 1 Survey      Responses   [facility]   COVID-19 Test Status  Negative   Does the patient have one of the following disease processes/diagnoses(primary or secondary)?  Other   Is there a successful TCM telephone encounter documented?  No   Week 1 attempt successful?  Yes   Call start time  1432   Call end time  1435   Discharge diagnosis  Ascites    Meds reviewed with patient/caregiver?  Yes   Is the patient having any side effects they believe may be caused by any medication additions or changes?  No   Does the patient have all medications ordered at discharge?  Yes   Is the patient taking all medications as directed (includes completed medication regime)?  Yes   Comments regarding appointments  Appt with GI on 9/8/20   Does the patient have a primary care provider?   Yes   Does the patient have an appointment with their PCP within 7 days of discharge?  No   What is preventing the patient from scheduling follow up appointments within 7 days of discharge?  -- [Pt wants to wait and take care of ascites first]   Nursing Interventions  Advised patient to make appointment   Has the patient kept scheduled appointments due by today?  N/A   Pulse Ox monitoring  None   Psychosocial issues?  No   Did the patient receive a copy of their discharge instructions?  Yes   Nursing interventions  Reviewed instructions with patient   What is the patient's perception of their health status since discharge?  Same [Pt reports, \"I'm swelling again\"]   Is the patient/caregiver able to teach back signs and symptoms related to disease process for when to call PCP?  Yes   Is the patient/caregiver able to teach back signs and symptoms related to disease process for when to call 911?  Yes   Is the patient/caregiver able to teach back the hierarchy of who to call/visit for symptoms/problems? PCP, Specialist, Home health nurse, Urgent Care, ED, 911  Yes   If the patient is a current smoker, " are they able to teach back resources for cessation?  -- [Nonsmoker]   Week 1 call completed?  Yes          Iza Riley RN

## 2020-09-04 NOTE — TELEPHONE ENCOUNTER
Called pt to change appt but first available was 10/06. Advised pt will send message to Dr Luo to see if that is ok. Pt verb understading.

## 2020-09-05 LAB
BACTERIA FLD CULT: NORMAL
BACTERIA SPEC ANAEROBE CULT: NORMAL
GRAM STN SPEC: NORMAL
GRAM STN SPEC: NORMAL

## 2020-09-10 ENCOUNTER — HOSPITAL ENCOUNTER (OUTPATIENT)
Dept: ULTRASOUND IMAGING | Facility: HOSPITAL | Age: 30
Discharge: HOME | End: 2020-09-10
Admitting: INTERNAL MEDICINE

## 2020-09-10 VITALS
SYSTOLIC BLOOD PRESSURE: 127 MMHG | RESPIRATION RATE: 16 BRPM | TEMPERATURE: 97.8 F | DIASTOLIC BLOOD PRESSURE: 83 MMHG | WEIGHT: 213 LBS | OXYGEN SATURATION: 96 % | HEART RATE: 109 BPM | BODY MASS INDEX: 33.43 KG/M2 | HEIGHT: 67 IN

## 2020-09-10 DIAGNOSIS — K74.69 OTHER CIRRHOSIS OF LIVER (HCC): ICD-10-CM

## 2020-09-10 DIAGNOSIS — R18.8 OTHER ASCITES: ICD-10-CM

## 2020-09-10 LAB
APPEARANCE FLD: ABNORMAL
COLOR FLD: YELLOW
INR PPP: 1 (ref 0.8–1.2)
LYMPHOCYTES NFR FLD MANUAL: 31 %
METHOD: ABNORMAL
MONOCYTES NFR FLD: 7 %
MONOS+MACROS NFR FLD: 59 %
NEUTROPHILS NFR FLD MANUAL: 3 %
NUC CELL # FLD: 163 /MM3
PROTHROMBIN TIME: 12.4 SECONDS (ref 12.8–15.2)
RBC # FLD AUTO: 110 /MM3

## 2020-09-10 PROCEDURE — 85610 PROTHROMBIN TIME: CPT

## 2020-09-10 PROCEDURE — 25010000002 ALBUMIN HUMAN 25% PER 50 ML: Performed by: INTERNAL MEDICINE

## 2020-09-10 PROCEDURE — P9047 ALBUMIN (HUMAN), 25%, 50ML: HCPCS | Performed by: INTERNAL MEDICINE

## 2020-09-10 PROCEDURE — 76942 ECHO GUIDE FOR BIOPSY: CPT

## 2020-09-10 PROCEDURE — 89051 BODY FLUID CELL COUNT: CPT | Performed by: INTERNAL MEDICINE

## 2020-09-10 PROCEDURE — 25010000003 LIDOCAINE 1 % SOLUTION: Performed by: RADIOLOGY

## 2020-09-10 RX ORDER — HYDROCODONE BITARTRATE AND ACETAMINOPHEN 7.5; 325 MG/1; MG/1
1 TABLET ORAL ONCE AS NEEDED
Status: COMPLETED | OUTPATIENT
Start: 2020-09-10 | End: 2020-09-10

## 2020-09-10 RX ORDER — LIDOCAINE HYDROCHLORIDE 10 MG/ML
10 INJECTION, SOLUTION INFILTRATION; PERINEURAL ONCE
Status: COMPLETED | OUTPATIENT
Start: 2020-09-10 | End: 2020-09-10

## 2020-09-10 RX ORDER — ALBUMIN (HUMAN) 12.5 G/50ML
25 SOLUTION INTRAVENOUS ONCE
Status: COMPLETED | OUTPATIENT
Start: 2020-09-10 | End: 2020-09-10

## 2020-09-10 RX ORDER — BUSPIRONE HYDROCHLORIDE 10 MG/1
10 TABLET ORAL 2 TIMES DAILY
COMMUNITY
Start: 2020-06-26

## 2020-09-10 RX ADMIN — ALBUMIN HUMAN 25 G: 0.25 SOLUTION INTRAVENOUS at 15:00

## 2020-09-10 RX ADMIN — LIDOCAINE HYDROCHLORIDE 10 ML: 10 INJECTION, SOLUTION INFILTRATION; PERINEURAL at 13:58

## 2020-09-10 RX ADMIN — HYDROCODONE BITARTRATE AND ACETAMINOPHEN 1 TABLET: 7.5; 325 TABLET ORAL at 14:43

## 2020-09-10 NOTE — DISCHARGE INSTRUCTIONS
EDUCATION /DISCHARGE INSTRUCTIONS  Paracentesis:  A needle is inserted into the space between your abdominal organs and the membrane that surrounds them (peritoneal space).  It is done for the diagnosis and treatment of fluid that is resistant to other therapies.  It helps determine the cause of the fluid and at the relieves pressure created by the fluid.  A sample is obtained and sent to the laboratory for study.  During the procedure:  You will lie on a bed on your back with your legs drawn up.  Your abdomen will be exposed from the chest to the pelvis.  You will otherwise be covered to maintain comfort.  A physician will clean your abdomen with antiseptic soap, place a sterile towel around the site and administer a local anesthetic to numb the area.  The physician will insert a needle into your abdominal wall.  There may be a popping sound which signifies the needle has pierced the abdominal wall. Next, the physician will attach tubing to transfer a sample into a collection bottle.  After the fluid is obtained the needle will be removed.  A pressure dressing is applied to the site.  Risks of the procedure include but are not limited to:   *  Bleeding    *  Wound infection   *  Low blood pressure   *  Decreased urination   *  Low sodium if a large amount of fluid is removed   *  Puncture of abdominal organs by the needle    Benefits of the procedure:  Benefits include the removal of fluid from the abdomen, relief of abdominal pressure and facilitation of a diagnosis.  Alternatives to the procedure:  Possible alternatives are diuretic drug therapy or surgery to place a shunt to drain fluid.  Risks of diuretic drug therapy include possible dehydration and renal failure.  The benefit of drug therapy is that it can be done at home under physician supervision.  Risks of shunt placement include exposure to anesthesia, infection, excessive bleeding and injury to abdominal organs.  The benefit of a shunt is that it can be  used to drain fluid over a longer period of time.  THIS EDUCATION INFORMATION WAS REVIEWED PRIOR TO THE PROCEDURE AND CONSENT. Patient initials__________________Time_____1327____________    Post procedure: (Follow all the instructions below carefully)   *  Weigh yourself daily.   *  Follow your doctors dietary instructions.   *  Rest today (no pushing pulling, straining or heavy lifting).   *  Slowly increase activity tomorow.   *  If you received sedation do not drive for 24 hours.              * Skin affix  applied to puncture site. Do not try to remove, scratch or apply lotion   * Skin affix will fall off on it's own   *  You may shower tomorrow  Call your doctor if experiencing:   *  Signs of infection such as redness, swelling, excessive pain and / or foul       smelling drainage from the puncture site.   *  Chills or fever over 101 degrees (by mouth).   *  Fainting or any noted Rapid weight gain/loss   *  Unrelieved pain or any new or severe symptoms  Following the procedure:      Follow-up with the ordering physician as directed.   Continue to take other medications as directed by your physician unless    otherwise instructed.   If applicable, resume taking your blood thinners or Aspirin in 24 hours.              If you have any concerns please call the Radiology Nurses Desk at [phone]

## 2020-09-10 NOTE — NURSING NOTE
D/C instructions discussed and understood by patient. Puncture site dry and intact. No distress.

## 2020-09-11 ENCOUNTER — TELEPHONE (OUTPATIENT)
Dept: INTERVENTIONAL RADIOLOGY/VASCULAR | Facility: HOSPITAL | Age: 30
End: 2020-09-11

## 2020-09-11 ENCOUNTER — READMISSION MANAGEMENT (OUTPATIENT)
Dept: CALL CENTER | Facility: HOSPITAL | Age: 30
End: 2020-09-11

## 2020-09-11 NOTE — OUTREACH NOTE
Medical Week 2 Survey      Responses   [facility]   COVID-19 Test Status  Negative   Does the patient have one of the following disease processes/diagnoses(primary or secondary)?  Other   Week 2 attempt successful?  Yes   Call start time  1255   Discharge diagnosis  Ascites    Call end time  1257   Meds reviewed with patient/caregiver?  Yes   Is the patient having any side effects they believe may be caused by any medication additions or changes?  No   Does the patient have all medications ordered at discharge?  Yes   Is the patient taking all medications as directed (includes completed medication regime)?  Yes   Does the patient have a primary care provider?   Yes   Does the patient have an appointment with their PCP within 7 days of discharge?  Yes   Has the patient kept scheduled appointments due by today?  Yes   Comments  had 5 liters removed via paracentesis on 9/10/20   Did the patient receive a copy of their discharge instructions?  Yes   Nursing interventions  Reviewed instructions with patient   What is the patient's perception of their health status since discharge?  Improving   Is the patient/caregiver able to teach back signs and symptoms related to disease process for when to call PCP?  Yes   Is the patient/caregiver able to teach back signs and symptoms related to disease process for when to call 911?  Yes   Is the patient/caregiver able to teach back the hierarchy of who to call/visit for symptoms/problems? PCP, Specialist, Home health nurse, Urgent Care, ED, 911  Yes   Week 2 Call Completed?  Yes          Gerri Quigley RN

## 2020-09-15 ENCOUNTER — TELEPHONE (OUTPATIENT)
Dept: GASTROENTEROLOGY | Facility: CLINIC | Age: 30
End: 2020-09-15

## 2020-09-15 NOTE — TELEPHONE ENCOUNTER
----- Message from Ewa Goodwin sent at 9/15/2020  1:52 PM EDT -----  Regarding: med  Contact: [phone]  Pt is calling about a medication not listed Rifazimin 550 mg, she is out and needs this. No pharmacy is listed  but she goes to [facility] on [address] [phone]. Would like a call.

## 2020-09-15 NOTE — TELEPHONE ENCOUNTER
Message to DR Luo re: request.  Cirrhosis pt who yet been seen in office - seen in hosp.  Was to f/u with [facility].

## 2020-09-21 ENCOUNTER — TELEPHONE (OUTPATIENT)
Dept: GASTROENTEROLOGY | Facility: CLINIC | Age: 30
End: 2020-09-21

## 2020-09-21 ENCOUNTER — READMISSION MANAGEMENT (OUTPATIENT)
Dept: CALL CENTER | Facility: HOSPITAL | Age: 30
End: 2020-09-21

## 2020-09-21 NOTE — OUTREACH NOTE
Medical Week 3 Survey      Responses   [facility]   COVID-19 Test Status  Negative   Does the patient have one of the following disease processes/diagnoses(primary or secondary)?  Other   Week 3 attempt successful?  Yes   Call start time  1708   Call end time  1712   Discharge diagnosis  Ascites    Meds reviewed with patient/caregiver?  Yes   Is the patient having any side effects they believe may be caused by any medication additions or changes?  No   Does the patient have all medications ordered at discharge?  Yes   Is the patient taking all medications as directed (includes completed medication regime)?  Yes   Does the patient have a primary care provider?   Yes   Does the patient have an appointment with their PCP within 7 days of discharge?  Yes [video visit]   Has the patient kept scheduled appointments due by today?  Yes   Comments  saw hematologist 9/21/20   Has home health visited the patient within 72 hours of discharge?  N/A   Pulse Ox monitoring  None   Psychosocial issues?  No   Did the patient receive a copy of their discharge instructions?  Yes   Nursing interventions  Reviewed instructions with patient   What is the patient's perception of their health status since discharge?  Improving   Is the patient/caregiver able to teach back signs and symptoms related to disease process for when to call PCP?  Yes   Is the patient/caregiver able to teach back signs and symptoms related to disease process for when to call 911?  Yes   Is the patient/caregiver able to teach back the hierarchy of who to call/visit for symptoms/problems? PCP, Specialist, Home health nurse, Urgent Care, ED, 911  Yes   Additional teach back comments  pt now has standing orders to make appts as needed for paracentesis   Week 3 Call Completed?  Yes          Gerri Quigley RN

## 2020-09-21 NOTE — TELEPHONE ENCOUNTER
----- Message from Ewa Rodriguez sent at 9/21/2020  9:54 AM EDT -----  Regarding: REQUEST FOR STANDING ORDER PARACENTESIS  [facility] [phone] DEPT TOLD HER TO CALL OVER TO US AND HAVE A STANDING ORDER PUT IN FOR PARACENTESIS SO THAT WHEN SHE NEEDS IT SHE CAN GO.       PT- [phone]

## 2020-09-22 DIAGNOSIS — K74.69 OTHER CIRRHOSIS OF LIVER (HCC): ICD-10-CM

## 2020-09-22 DIAGNOSIS — R18.8 OTHER ASCITES: Primary | ICD-10-CM

## 2020-09-22 RX ORDER — ALBUMIN (HUMAN) 12.5 G/50ML
25 SOLUTION INTRAVENOUS ONCE
Status: CANCELLED | OUTPATIENT
Start: 2020-10-02 | End: 2020-09-22

## 2020-09-23 NOTE — TELEPHONE ENCOUNTER
Received a fax from [facility] with all PA information completed. Faxed to [employer] at [phone] with confirmation received.

## 2020-09-29 ENCOUNTER — READMISSION MANAGEMENT (OUTPATIENT)
Dept: CALL CENTER | Facility: HOSPITAL | Age: 30
End: 2020-09-29

## 2020-09-29 NOTE — TELEPHONE ENCOUNTER
Call to pt.  Advise Xifaxan has been approved.  Verb understanding.  States expecting delivery today.

## 2020-09-29 NOTE — OUTREACH NOTE
Medical Week 4 Survey      Responses   [facility]   COVID-19 Test Status  Negative   Does the patient have one of the following disease processes/diagnoses(primary or secondary)?  Other   Week 4 attempt successful?  Yes   Call start time  1617   Call end time  1621   Discharge diagnosis  Ascites    Meds reviewed with patient/caregiver?  Yes   Is the patient having any side effects they believe may be caused by any medication additions or changes?  No   Is the patient taking all medications as directed (includes completed medication regime)?  Yes   Has the patient kept scheduled appointments due by today?  Yes   Comments  Paracentesis on Friday   Is the patient still receiving Home Health Services?  N/A   Pulse Ox monitoring  None   Psychosocial issues?  No   What is the patient's perception of their health status since discharge?  Improving   Is the patient/caregiver able to teach back signs and symptoms related to disease process for when to call PCP?  Yes   Is the patient/caregiver able to teach back signs and symptoms related to disease process for when to call 911?  Yes   Is the patient/caregiver able to teach back the hierarchy of who to call/visit for symptoms/problems? PCP, Specialist, Home health nurse, Urgent Care, ED, 911  Yes   Additional teach back comments  Lactulose is working and her belly is only half way up.   Week 4 Call Completed?  Yes   Would the patient like one additional call?  No   Graduated  Yes   Did the patient feel the follow up calls were helpful during their recovery period?  Yes   Was the number of calls appropriate?  Yes   Wrap up additional comments  She is doing much better.          Rowan Kumar RN

## 2020-10-02 ENCOUNTER — HOSPITAL ENCOUNTER (OUTPATIENT)
Dept: ULTRASOUND IMAGING | Facility: HOSPITAL | Age: 30
Discharge: HOME | End: 2020-10-02
Admitting: RADIOLOGY

## 2020-10-02 ENCOUNTER — RESULTS ENCOUNTER (OUTPATIENT)
Dept: GASTROENTEROLOGY | Facility: CLINIC | Age: 30
End: 2020-10-02

## 2020-10-02 VITALS
RESPIRATION RATE: 18 BRPM | OXYGEN SATURATION: 97 % | HEART RATE: 97 BPM | TEMPERATURE: 98 F | DIASTOLIC BLOOD PRESSURE: 87 MMHG | HEIGHT: 67 IN | WEIGHT: 179 LBS | SYSTOLIC BLOOD PRESSURE: 123 MMHG | BODY MASS INDEX: 28.09 KG/M2

## 2020-10-02 DIAGNOSIS — K74.69 OTHER CIRRHOSIS OF LIVER (HCC): ICD-10-CM

## 2020-10-02 DIAGNOSIS — R18.8 OTHER ASCITES: ICD-10-CM

## 2020-10-02 PROCEDURE — 76942 ECHO GUIDE FOR BIOPSY: CPT

## 2020-10-02 PROCEDURE — 25010000003 LIDOCAINE 1 % SOLUTION: Performed by: RADIOLOGY

## 2020-10-02 RX ORDER — HYDROCODONE BITARTRATE AND ACETAMINOPHEN 5; 325 MG/1; MG/1
1 TABLET ORAL EVERY 6 HOURS PRN
Status: DISCONTINUED | OUTPATIENT
Start: 2020-10-02 | End: 2020-10-03 | Stop reason: HOSPADM

## 2020-10-02 RX ORDER — ALBUMIN (HUMAN) 12.5 G/50ML
25 SOLUTION INTRAVENOUS ONCE
Status: DISCONTINUED | OUTPATIENT
Start: 2020-10-02 | End: 2020-10-03 | Stop reason: HOSPADM

## 2020-10-02 RX ORDER — LACTULOSE 10 G/15ML
30 SOLUTION ORAL ONCE
COMMUNITY

## 2020-10-02 RX ORDER — LIDOCAINE HYDROCHLORIDE 10 MG/ML
10 INJECTION, SOLUTION INFILTRATION; PERINEURAL ONCE
Status: COMPLETED | OUTPATIENT
Start: 2020-10-02 | End: 2020-10-02

## 2020-10-02 RX ADMIN — LIDOCAINE HYDROCHLORIDE 10 ML: 10 INJECTION, SOLUTION INFILTRATION; PERINEURAL at 13:10

## 2020-10-02 RX ADMIN — HYDROCODONE BITARTRATE AND ACETAMINOPHEN 1 TABLET: 5; 325 TABLET ORAL at 12:19

## 2020-10-02 NOTE — DISCHARGE INSTRUCTIONS
EDUCATION /DISCHARGE INSTRUCTIONS  Paracentesis:  A needle is inserted into the space between your abdominal organs and the membrane that surrounds them (peritoneal space).  It is done for the diagnosis and treatment of fluid that is resistant to other therapies.  It helps determine the cause of the fluid and at the relieves pressure created by the fluid.  A sample is obtained and sent to the laboratory for study.  During the procedure:  You will lie on a bed on your back with your legs drawn up.  Your abdomen will be exposed from the chest to the pelvis.  You will otherwise be covered to maintain comfort.  A physician will clean your abdomen with antiseptic soap, place a sterile towel around the site and administer a local anesthetic to numb the area.  The physician will insert a needle into your abdominal wall.  There may be a popping sound which signifies the needle has pierced the abdominal wall. Next, the physician will attach tubing to transfer a sample into a collection bottle.  After the fluid is obtained the needle will be removed.  A pressure dressing is applied to the site.  Risks of the procedure include but are not limited to:   *  Bleeding    *  Wound infection   *  Low blood pressure   *  Decreased urination   *  Low sodium if a large amount of fluid is removed   *  Puncture of abdominal organs by the needle    Benefits of the procedure:  Benefits include the removal of fluid from the abdomen, relief of abdominal pressure and facilitation of a diagnosis.  Alternatives to the procedure:  Possible alternatives are diuretic drug therapy or surgery to place a shunt to drain fluid.  Risks of diuretic drug therapy include possible dehydration and renal failure.  The benefit of drug therapy is that it can be done at home under physician supervision.  Risks of shunt placement include exposure to anesthesia, infection, excessive bleeding and injury to abdominal organs.  The benefit of a shunt is that it can be  used to drain fluid over a longer period of time.  THIS EDUCATION INFORMATION WAS REVIEWED PRIOR TO THE PROCEDURE AND CONSENT. Patient initials__________________Time_____1200____________    Post procedure: (Follow all the instructions below carefully)   *  Weigh yourself daily.   *  Follow your doctors dietary instructions.   *  Rest today (no pushing pulling, straining or heavy lifting).   *  Slowly increase activity tomorow.   *  If you received sedation do not drive for 24 hours.              * Skin affix  applied to puncture site. Do not try to remove, scratch or apply lotion   * Skin affix will fall off on it's own   *  You may shower tomorrow  Call your doctor if experiencing:   *  Signs of infection such as redness, swelling, excessive pain and / or foul       smelling drainage from the puncture site.   *  Chills or fever over 101 degrees (by mouth).   *  Fainting or any noted Rapid weight gain/loss   *  Unrelieved pain or any new or severe symptoms  Following the procedure:      Follow-up with the ordering physician as directed.   Continue to take other medications as directed by your physician unless    otherwise instructed.   If applicable, resume taking your blood thinners or Aspirin in 24 hours.              If you have any concerns please call the Radiology Nurses Desk at [phone]

## 2020-10-16 ENCOUNTER — RESULTS ENCOUNTER (OUTPATIENT)
Dept: GASTROENTEROLOGY | Facility: CLINIC | Age: 30
End: 2020-10-16

## 2020-10-16 DIAGNOSIS — R18.8 OTHER ASCITES: ICD-10-CM

## 2020-10-16 DIAGNOSIS — K74.69 OTHER CIRRHOSIS OF LIVER (HCC): ICD-10-CM

## 2020-10-30 ENCOUNTER — RESULTS ENCOUNTER (OUTPATIENT)
Dept: GASTROENTEROLOGY | Facility: CLINIC | Age: 30
End: 2020-10-30

## 2020-10-30 DIAGNOSIS — R18.8 OTHER ASCITES: ICD-10-CM

## 2020-10-30 DIAGNOSIS — K74.69 OTHER CIRRHOSIS OF LIVER (HCC): ICD-10-CM

## 2020-11-13 ENCOUNTER — RESULTS ENCOUNTER (OUTPATIENT)
Dept: GASTROENTEROLOGY | Facility: CLINIC | Age: 30
End: 2020-11-13

## 2020-11-13 DIAGNOSIS — K74.69 OTHER CIRRHOSIS OF LIVER (HCC): ICD-10-CM

## 2020-11-13 DIAGNOSIS — R18.8 OTHER ASCITES: ICD-10-CM

## 2020-11-27 ENCOUNTER — RESULTS ENCOUNTER (OUTPATIENT)
Dept: GASTROENTEROLOGY | Facility: CLINIC | Age: 30
End: 2020-11-27

## 2020-11-27 DIAGNOSIS — R18.8 OTHER ASCITES: ICD-10-CM

## 2020-11-27 DIAGNOSIS — K74.69 OTHER CIRRHOSIS OF LIVER (HCC): ICD-10-CM

## 2020-12-11 ENCOUNTER — RESULTS ENCOUNTER (OUTPATIENT)
Dept: GASTROENTEROLOGY | Facility: CLINIC | Age: 30
End: 2020-12-11

## 2020-12-11 DIAGNOSIS — R18.8 OTHER ASCITES: ICD-10-CM

## 2020-12-11 DIAGNOSIS — K74.69 OTHER CIRRHOSIS OF LIVER (HCC): ICD-10-CM

## 2020-12-25 ENCOUNTER — RESULTS ENCOUNTER (OUTPATIENT)
Dept: GASTROENTEROLOGY | Facility: CLINIC | Age: 30
End: 2020-12-25

## 2020-12-25 DIAGNOSIS — R18.8 OTHER ASCITES: ICD-10-CM

## 2020-12-25 DIAGNOSIS — K74.69 OTHER CIRRHOSIS OF LIVER (HCC): ICD-10-CM

## 2022-04-11 ENCOUNTER — LAB (OUTPATIENT)
Dept: LAB | Facility: HOSPITAL | Age: 32
End: 2022-04-11

## 2022-04-11 ENCOUNTER — TRANSCRIBE ORDERS (OUTPATIENT)
Dept: ADMINISTRATIVE | Facility: HOSPITAL | Age: 32
End: 2022-04-11

## 2022-04-11 DIAGNOSIS — Z79.899 ENCOUNTER FOR LONG-TERM (CURRENT) USE OF OTHER MEDICATIONS: Primary | ICD-10-CM

## 2022-04-11 DIAGNOSIS — Z79.899 ENCOUNTER FOR LONG-TERM (CURRENT) USE OF OTHER MEDICATIONS: ICD-10-CM

## 2022-04-11 PROCEDURE — 36415 COLL VENOUS BLD VENIPUNCTURE: CPT

## 2022-04-11 PROCEDURE — 86480 TB TEST CELL IMMUN MEASURE: CPT

## 2022-04-13 LAB
GAMMA INTERFERON BACKGROUND BLD IA-ACNC: 0.04 IU/ML
M TB IFN-G BLD-IMP: NEGATIVE
M TB IFN-G CD4+ BCKGRND COR BLD-ACNC: 0.07 IU/ML
M TB IFN-G CD4+CD8+ BCKGRND COR BLD-ACNC: 0.04 IU/ML
MITOGEN IGNF BLD-ACNC: >10 IU/ML
QUANTIFERON INCUBATION: NORMAL
SERVICE CMNT-IMP: NORMAL

## 2022-12-08 ENCOUNTER — TRANSCRIBE ORDERS (OUTPATIENT)
Dept: ADMINISTRATIVE | Facility: HOSPITAL | Age: 32
End: 2022-12-08

## 2022-12-08 ENCOUNTER — LAB (OUTPATIENT)
Dept: LAB | Facility: HOSPITAL | Age: 32
End: 2022-12-08

## 2022-12-08 DIAGNOSIS — Z79.899 ENCOUNTER FOR LONG-TERM (CURRENT) USE OF OTHER MEDICATIONS: ICD-10-CM

## 2022-12-08 DIAGNOSIS — M25.50 PAIN IN JOINT, MULTIPLE SITES: ICD-10-CM

## 2022-12-08 DIAGNOSIS — L40.0 PSORIASIS VULGARIS: Primary | ICD-10-CM

## 2022-12-08 DIAGNOSIS — L40.0 PSORIASIS VULGARIS: ICD-10-CM

## 2022-12-08 LAB
ALBUMIN SERPL-MCNC: 4.3 G/DL (ref 3.5–5.2)
ALBUMIN/GLOB SERPL: 1.9 G/DL
ALP SERPL-CCNC: 81 U/L (ref 39–117)
ALT SERPL W P-5'-P-CCNC: 19 U/L (ref 1–33)
ANION GAP SERPL CALCULATED.3IONS-SCNC: 9 MMOL/L (ref 5–15)
AST SERPL-CCNC: 21 U/L (ref 1–32)
BASOPHILS # BLD AUTO: 0.04 10*3/MM3 (ref 0–0.2)
BASOPHILS NFR BLD AUTO: 0.5 % (ref 0–1.5)
BILIRUB SERPL-MCNC: 0.3 MG/DL (ref 0–1.2)
BUN SERPL-MCNC: 9 MG/DL (ref 6–20)
BUN/CREAT SERPL: 14.1 (ref 7–25)
CALCIUM SPEC-SCNC: 9.2 MG/DL (ref 8.6–10.5)
CHLORIDE SERPL-SCNC: 104 MMOL/L (ref 98–107)
CO2 SERPL-SCNC: 28 MMOL/L (ref 22–29)
CREAT SERPL-MCNC: 0.64 MG/DL (ref 0.57–1)
DEPRECATED RDW RBC AUTO: 39.3 FL (ref 37–54)
EGFRCR SERPLBLD CKD-EPI 2021: 119.1 ML/MIN/1.73
EOSINOPHIL # BLD AUTO: 0.2 10*3/MM3 (ref 0–0.4)
EOSINOPHIL NFR BLD AUTO: 2.5 % (ref 0.3–6.2)
ERYTHROCYTE [DISTWIDTH] IN BLOOD BY AUTOMATED COUNT: 11.5 % (ref 12.3–15.4)
GLOBULIN UR ELPH-MCNC: 2.3 GM/DL
GLUCOSE SERPL-MCNC: 88 MG/DL (ref 65–99)
HCT VFR BLD AUTO: 40.6 % (ref 34–46.6)
HGB BLD-MCNC: 13.9 G/DL (ref 12–15.9)
IMM GRANULOCYTES # BLD AUTO: 0.02 10*3/MM3 (ref 0–0.05)
IMM GRANULOCYTES NFR BLD AUTO: 0.2 % (ref 0–0.5)
LYMPHOCYTES # BLD AUTO: 3.87 10*3/MM3 (ref 0.7–3.1)
LYMPHOCYTES NFR BLD AUTO: 47.5 % (ref 19.6–45.3)
MCH RBC QN AUTO: 32.9 PG (ref 26.6–33)
MCHC RBC AUTO-ENTMCNC: 34.2 G/DL (ref 31.5–35.7)
MCV RBC AUTO: 96 FL (ref 79–97)
MONOCYTES # BLD AUTO: 0.66 10*3/MM3 (ref 0.1–0.9)
MONOCYTES NFR BLD AUTO: 8.1 % (ref 5–12)
NEUTROPHILS NFR BLD AUTO: 3.36 10*3/MM3 (ref 1.7–7)
NEUTROPHILS NFR BLD AUTO: 41.2 % (ref 42.7–76)
NRBC BLD AUTO-RTO: 0 /100 WBC (ref 0–0.2)
PLATELET # BLD AUTO: 278 10*3/MM3 (ref 140–450)
PMV BLD AUTO: 9.5 FL (ref 6–12)
POTASSIUM SERPL-SCNC: 4.3 MMOL/L (ref 3.5–5.2)
PROT SERPL-MCNC: 6.6 G/DL (ref 6–8.5)
RBC # BLD AUTO: 4.23 10*6/MM3 (ref 3.77–5.28)
SODIUM SERPL-SCNC: 141 MMOL/L (ref 136–145)
WBC NRBC COR # BLD: 8.15 10*3/MM3 (ref 3.4–10.8)

## 2022-12-08 PROCEDURE — 80053 COMPREHEN METABOLIC PANEL: CPT

## 2022-12-08 PROCEDURE — 36415 COLL VENOUS BLD VENIPUNCTURE: CPT

## 2022-12-08 PROCEDURE — 85025 COMPLETE CBC W/AUTO DIFF WBC: CPT

## 2023-03-08 ENCOUNTER — TRANSCRIBE ORDERS (OUTPATIENT)
Dept: ADMINISTRATIVE | Facility: HOSPITAL | Age: 33
End: 2023-03-08
Payer: COMMERCIAL

## 2023-03-08 ENCOUNTER — LAB (OUTPATIENT)
Dept: LAB | Facility: HOSPITAL | Age: 33
End: 2023-03-08
Payer: COMMERCIAL

## 2023-03-08 DIAGNOSIS — Z79.899 ENCOUNTER FOR LONG-TERM (CURRENT) USE OF OTHER MEDICATIONS: ICD-10-CM

## 2023-03-08 DIAGNOSIS — Z79.899 ENCOUNTER FOR LONG-TERM (CURRENT) USE OF OTHER MEDICATIONS: Primary | ICD-10-CM

## 2023-03-08 LAB
ALBUMIN SERPL-MCNC: 4.4 G/DL (ref 3.5–5.2)
ALBUMIN/GLOB SERPL: 1.6 G/DL
ALP SERPL-CCNC: 94 U/L (ref 39–117)
ALT SERPL W P-5'-P-CCNC: 23 U/L (ref 1–33)
ANION GAP SERPL CALCULATED.3IONS-SCNC: 10.2 MMOL/L (ref 5–15)
AST SERPL-CCNC: 21 U/L (ref 1–32)
BASOPHILS # BLD AUTO: 0.06 10*3/MM3 (ref 0–0.2)
BASOPHILS NFR BLD AUTO: 0.6 % (ref 0–1.5)
BILIRUB SERPL-MCNC: 0.2 MG/DL (ref 0–1.2)
BUN SERPL-MCNC: 10 MG/DL (ref 6–20)
BUN/CREAT SERPL: 13.9 (ref 7–25)
CALCIUM SPEC-SCNC: 9.1 MG/DL (ref 8.6–10.5)
CHLORIDE SERPL-SCNC: 101 MMOL/L (ref 98–107)
CO2 SERPL-SCNC: 27.8 MMOL/L (ref 22–29)
CREAT SERPL-MCNC: 0.72 MG/DL (ref 0.57–1)
DEPRECATED RDW RBC AUTO: 39.5 FL (ref 37–54)
EGFRCR SERPLBLD CKD-EPI 2021: 112.7 ML/MIN/1.73
EOSINOPHIL # BLD AUTO: 0.25 10*3/MM3 (ref 0–0.4)
EOSINOPHIL NFR BLD AUTO: 2.4 % (ref 0.3–6.2)
ERYTHROCYTE [DISTWIDTH] IN BLOOD BY AUTOMATED COUNT: 11.4 % (ref 12.3–15.4)
GLOBULIN UR ELPH-MCNC: 2.8 GM/DL
GLUCOSE SERPL-MCNC: 69 MG/DL (ref 65–99)
HCT VFR BLD AUTO: 42.4 % (ref 34–46.6)
HGB BLD-MCNC: 14.7 G/DL (ref 12–15.9)
IMM GRANULOCYTES # BLD AUTO: 0.03 10*3/MM3 (ref 0–0.05)
IMM GRANULOCYTES NFR BLD AUTO: 0.3 % (ref 0–0.5)
LYMPHOCYTES # BLD AUTO: 3.81 10*3/MM3 (ref 0.7–3.1)
LYMPHOCYTES NFR BLD AUTO: 36.8 % (ref 19.6–45.3)
MCH RBC QN AUTO: 33.1 PG (ref 26.6–33)
MCHC RBC AUTO-ENTMCNC: 34.7 G/DL (ref 31.5–35.7)
MCV RBC AUTO: 95.5 FL (ref 79–97)
MONOCYTES # BLD AUTO: 0.95 10*3/MM3 (ref 0.1–0.9)
MONOCYTES NFR BLD AUTO: 9.2 % (ref 5–12)
NEUTROPHILS NFR BLD AUTO: 5.25 10*3/MM3 (ref 1.7–7)
NEUTROPHILS NFR BLD AUTO: 50.7 % (ref 42.7–76)
NRBC BLD AUTO-RTO: 0.1 /100 WBC (ref 0–0.2)
PLATELET # BLD AUTO: 337 10*3/MM3 (ref 140–450)
PMV BLD AUTO: 9.5 FL (ref 6–12)
POTASSIUM SERPL-SCNC: 3.8 MMOL/L (ref 3.5–5.2)
PROT SERPL-MCNC: 7.2 G/DL (ref 6–8.5)
RBC # BLD AUTO: 4.44 10*6/MM3 (ref 3.77–5.28)
SODIUM SERPL-SCNC: 139 MMOL/L (ref 136–145)
WBC NRBC COR # BLD: 10.35 10*3/MM3 (ref 3.4–10.8)

## 2023-03-08 PROCEDURE — 36415 COLL VENOUS BLD VENIPUNCTURE: CPT

## 2023-03-08 PROCEDURE — 80053 COMPREHEN METABOLIC PANEL: CPT

## 2023-03-08 PROCEDURE — 86480 TB TEST CELL IMMUN MEASURE: CPT

## 2023-03-08 PROCEDURE — 85025 COMPLETE CBC W/AUTO DIFF WBC: CPT

## 2023-03-10 LAB
GAMMA INTERFERON BACKGROUND BLD IA-ACNC: 0.06 IU/ML
M TB IFN-G BLD-IMP: NEGATIVE
M TB IFN-G CD4+ T-CELLS BLD-ACNC: 0.06 IU/ML
M TBIFN-G CD4+ CD8+T-CELLS BLD-ACNC: 0.07 IU/ML
MITOGEN IGNF BLD-ACNC: >10 IU/ML
QUANTIFERON INCUBATION: NORMAL
SERVICE CMNT-IMP: NORMAL

## 2023-06-02 ENCOUNTER — TRANSCRIBE ORDERS (OUTPATIENT)
Dept: ADMINISTRATIVE | Facility: HOSPITAL | Age: 33
End: 2023-06-02

## 2023-06-02 ENCOUNTER — LAB (OUTPATIENT)
Dept: LAB | Facility: HOSPITAL | Age: 33
End: 2023-06-02
Payer: COMMERCIAL

## 2023-06-02 DIAGNOSIS — F45.8 ANXIETY HYPERVENTILATION: Primary | ICD-10-CM

## 2023-06-02 DIAGNOSIS — R53.83 TIREDNESS: ICD-10-CM

## 2023-06-02 DIAGNOSIS — E78.5 HYPERLIPIDEMIA, UNSPECIFIED HYPERLIPIDEMIA TYPE: ICD-10-CM

## 2023-06-02 DIAGNOSIS — E55.9 AVITAMINOSIS D: ICD-10-CM

## 2023-06-02 DIAGNOSIS — F45.8 ANXIETY HYPERVENTILATION: ICD-10-CM

## 2023-06-02 DIAGNOSIS — F41.9 ANXIETY HYPERVENTILATION: ICD-10-CM

## 2023-06-02 DIAGNOSIS — F41.9 ANXIETY HYPERVENTILATION: Primary | ICD-10-CM

## 2023-06-02 DIAGNOSIS — D64.9 ANEMIA, UNSPECIFIED TYPE: ICD-10-CM

## 2023-06-02 DIAGNOSIS — I10 ESSENTIAL HYPERTENSION, MALIGNANT: ICD-10-CM

## 2023-06-02 LAB
25(OH)D3 SERPL-MCNC: 19.9 NG/ML (ref 30–100)
ALBUMIN SERPL-MCNC: 4.7 G/DL (ref 3.5–5.2)
ALBUMIN/GLOB SERPL: 1.7 G/DL
ALP SERPL-CCNC: 91 U/L (ref 39–117)
ALT SERPL W P-5'-P-CCNC: 24 U/L (ref 1–33)
ANION GAP SERPL CALCULATED.3IONS-SCNC: 12 MMOL/L (ref 5–15)
AST SERPL-CCNC: 30 U/L (ref 1–32)
BASOPHILS # BLD AUTO: 0.07 10*3/MM3 (ref 0–0.2)
BASOPHILS NFR BLD AUTO: 0.6 % (ref 0–1.5)
BILIRUB SERPL-MCNC: 0.4 MG/DL (ref 0–1.2)
BUN SERPL-MCNC: 8 MG/DL (ref 6–20)
BUN/CREAT SERPL: 14.8 (ref 7–25)
CALCIUM SPEC-SCNC: 9 MG/DL (ref 8.6–10.5)
CHLORIDE SERPL-SCNC: 103 MMOL/L (ref 98–107)
CHOLEST SERPL-MCNC: 220 MG/DL (ref 0–200)
CO2 SERPL-SCNC: 23 MMOL/L (ref 22–29)
CREAT SERPL-MCNC: 0.54 MG/DL (ref 0.57–1)
DEPRECATED RDW RBC AUTO: 40.1 FL (ref 37–54)
EGFRCR SERPLBLD CKD-EPI 2021: 123.3 ML/MIN/1.73
EOSINOPHIL # BLD AUTO: 0.17 10*3/MM3 (ref 0–0.4)
EOSINOPHIL NFR BLD AUTO: 1.5 % (ref 0.3–6.2)
ERYTHROCYTE [DISTWIDTH] IN BLOOD BY AUTOMATED COUNT: 11.6 % (ref 12.3–15.4)
GLOBULIN UR ELPH-MCNC: 2.7 GM/DL
GLUCOSE SERPL-MCNC: 76 MG/DL (ref 65–99)
HCT VFR BLD AUTO: 42.8 % (ref 34–46.6)
HDLC SERPL-MCNC: 60 MG/DL (ref 40–60)
HGB BLD-MCNC: 14.4 G/DL (ref 12–15.9)
IMM GRANULOCYTES # BLD AUTO: 0.04 10*3/MM3 (ref 0–0.05)
IMM GRANULOCYTES NFR BLD AUTO: 0.3 % (ref 0–0.5)
IRON 24H UR-MRATE: 166 MCG/DL (ref 37–145)
IRON SATN MFR SERPL: 42 % (ref 20–50)
LDLC SERPL CALC-MCNC: 139 MG/DL (ref 0–100)
LDLC/HDLC SERPL: 2.28 {RATIO}
LYMPHOCYTES # BLD AUTO: 3.27 10*3/MM3 (ref 0.7–3.1)
LYMPHOCYTES NFR BLD AUTO: 28.6 % (ref 19.6–45.3)
MCH RBC QN AUTO: 31.8 PG (ref 26.6–33)
MCHC RBC AUTO-ENTMCNC: 33.6 G/DL (ref 31.5–35.7)
MCV RBC AUTO: 94.5 FL (ref 79–97)
MONOCYTES # BLD AUTO: 0.66 10*3/MM3 (ref 0.1–0.9)
MONOCYTES NFR BLD AUTO: 5.8 % (ref 5–12)
NEUTROPHILS NFR BLD AUTO: 63.2 % (ref 42.7–76)
NEUTROPHILS NFR BLD AUTO: 7.24 10*3/MM3 (ref 1.7–7)
NRBC BLD AUTO-RTO: 0 /100 WBC (ref 0–0.2)
PLATELET # BLD AUTO: 405 10*3/MM3 (ref 140–450)
PMV BLD AUTO: 9.4 FL (ref 6–12)
POTASSIUM SERPL-SCNC: 4.3 MMOL/L (ref 3.5–5.2)
PROT SERPL-MCNC: 7.4 G/DL (ref 6–8.5)
RBC # BLD AUTO: 4.53 10*6/MM3 (ref 3.77–5.28)
SODIUM SERPL-SCNC: 138 MMOL/L (ref 136–145)
TIBC SERPL-MCNC: 399 MCG/DL (ref 298–536)
TRANSFERRIN SERPL-MCNC: 268 MG/DL (ref 200–360)
TRIGL SERPL-MCNC: 116 MG/DL (ref 0–150)
VLDLC SERPL-MCNC: 21 MG/DL (ref 5–40)
WBC NRBC COR # BLD: 11.45 10*3/MM3 (ref 3.4–10.8)

## 2023-06-02 PROCEDURE — 85025 COMPLETE CBC W/AUTO DIFF WBC: CPT

## 2023-06-02 PROCEDURE — 84443 ASSAY THYROID STIM HORMONE: CPT

## 2023-06-02 PROCEDURE — 83540 ASSAY OF IRON: CPT

## 2023-06-02 PROCEDURE — 80061 LIPID PANEL: CPT

## 2023-06-02 PROCEDURE — 36415 COLL VENOUS BLD VENIPUNCTURE: CPT

## 2023-06-02 PROCEDURE — 84466 ASSAY OF TRANSFERRIN: CPT

## 2023-06-02 PROCEDURE — 80053 COMPREHEN METABOLIC PANEL: CPT

## 2023-06-02 PROCEDURE — 82306 VITAMIN D 25 HYDROXY: CPT

## 2023-06-04 LAB — TSH SERPL DL<=0.005 MIU/L-ACNC: 1.02 UIU/ML (ref 0.45–4.5)

## 2023-12-29 ENCOUNTER — TRANSCRIBE ORDERS (OUTPATIENT)
Dept: ADMINISTRATIVE | Facility: HOSPITAL | Age: 33
End: 2023-12-29
Payer: COMMERCIAL

## 2023-12-29 ENCOUNTER — HOSPITAL ENCOUNTER (OUTPATIENT)
Dept: GENERAL RADIOLOGY | Facility: HOSPITAL | Age: 33
Discharge: HOME | End: 2023-12-29
Admitting: NURSE PRACTITIONER
Payer: COMMERCIAL

## 2023-12-29 DIAGNOSIS — N39.0 URINARY TRACT INFECTION WITHOUT HEMATURIA, SITE UNSPECIFIED: ICD-10-CM

## 2023-12-29 DIAGNOSIS — N39.0 URINARY TRACT INFECTION WITHOUT HEMATURIA, SITE UNSPECIFIED: Primary | ICD-10-CM

## 2023-12-29 PROCEDURE — 74018 RADEX ABDOMEN 1 VIEW: CPT

## 2024-01-25 ENCOUNTER — TRANSCRIBE ORDERS (OUTPATIENT)
Dept: ADMINISTRATIVE | Facility: HOSPITAL | Age: 34
End: 2024-01-25
Payer: COMMERCIAL

## 2024-01-25 ENCOUNTER — LAB (OUTPATIENT)
Dept: LAB | Facility: HOSPITAL | Age: 34
End: 2024-01-25
Payer: COMMERCIAL

## 2024-01-25 DIAGNOSIS — Z79.899 ENCOUNTER FOR LONG-TERM (CURRENT) USE OF OTHER MEDICATIONS: ICD-10-CM

## 2024-01-25 DIAGNOSIS — Z79.899 ENCOUNTER FOR LONG-TERM (CURRENT) USE OF OTHER MEDICATIONS: Primary | ICD-10-CM

## 2024-01-25 LAB
ALBUMIN SERPL-MCNC: 4.5 G/DL (ref 3.5–5.2)
ALBUMIN/GLOB SERPL: 2.4 G/DL
ALP SERPL-CCNC: 84 U/L (ref 39–117)
ALT SERPL W P-5'-P-CCNC: 15 U/L (ref 1–33)
ANION GAP SERPL CALCULATED.3IONS-SCNC: 13 MMOL/L (ref 5–15)
AST SERPL-CCNC: 18 U/L (ref 1–32)
BASOPHILS # BLD AUTO: 0.06 10*3/MM3 (ref 0–0.2)
BASOPHILS NFR BLD AUTO: 0.7 % (ref 0–1.5)
BILIRUB SERPL-MCNC: <0.2 MG/DL (ref 0–1.2)
BUN SERPL-MCNC: 8 MG/DL (ref 6–20)
BUN/CREAT SERPL: 10.7 (ref 7–25)
CALCIUM SPEC-SCNC: 8.6 MG/DL (ref 8.6–10.5)
CHLORIDE SERPL-SCNC: 105 MMOL/L (ref 98–107)
CO2 SERPL-SCNC: 25 MMOL/L (ref 22–29)
CREAT SERPL-MCNC: 0.75 MG/DL (ref 0.57–1)
DEPRECATED RDW RBC AUTO: 39.1 FL (ref 37–54)
EGFRCR SERPLBLD CKD-EPI 2021: 106.6 ML/MIN/1.73
EOSINOPHIL # BLD AUTO: 0.24 10*3/MM3 (ref 0–0.4)
EOSINOPHIL NFR BLD AUTO: 2.9 % (ref 0.3–6.2)
ERYTHROCYTE [DISTWIDTH] IN BLOOD BY AUTOMATED COUNT: 11.5 % (ref 12.3–15.4)
GLOBULIN UR ELPH-MCNC: 1.9 GM/DL
GLUCOSE SERPL-MCNC: 79 MG/DL (ref 65–99)
HCT VFR BLD AUTO: 38.5 % (ref 34–46.6)
HGB BLD-MCNC: 13 G/DL (ref 12–15.9)
IMM GRANULOCYTES # BLD AUTO: 0.02 10*3/MM3 (ref 0–0.05)
IMM GRANULOCYTES NFR BLD AUTO: 0.2 % (ref 0–0.5)
LYMPHOCYTES # BLD AUTO: 3.24 10*3/MM3 (ref 0.7–3.1)
LYMPHOCYTES NFR BLD AUTO: 38.8 % (ref 19.6–45.3)
MCH RBC QN AUTO: 31.9 PG (ref 26.6–33)
MCHC RBC AUTO-ENTMCNC: 33.8 G/DL (ref 31.5–35.7)
MCV RBC AUTO: 94.6 FL (ref 79–97)
MONOCYTES # BLD AUTO: 0.55 10*3/MM3 (ref 0.1–0.9)
MONOCYTES NFR BLD AUTO: 6.6 % (ref 5–12)
NEUTROPHILS NFR BLD AUTO: 4.23 10*3/MM3 (ref 1.7–7)
NEUTROPHILS NFR BLD AUTO: 50.8 % (ref 42.7–76)
NRBC BLD AUTO-RTO: 0 /100 WBC (ref 0–0.2)
PLATELET # BLD AUTO: 309 10*3/MM3 (ref 140–450)
PMV BLD AUTO: 9.9 FL (ref 6–12)
POTASSIUM SERPL-SCNC: 3.9 MMOL/L (ref 3.5–5.2)
PROT SERPL-MCNC: 6.4 G/DL (ref 6–8.5)
RBC # BLD AUTO: 4.07 10*6/MM3 (ref 3.77–5.28)
SODIUM SERPL-SCNC: 143 MMOL/L (ref 136–145)
WBC NRBC COR # BLD AUTO: 8.34 10*3/MM3 (ref 3.4–10.8)

## 2024-01-25 PROCEDURE — 85025 COMPLETE CBC W/AUTO DIFF WBC: CPT

## 2024-01-25 PROCEDURE — 86480 TB TEST CELL IMMUN MEASURE: CPT

## 2024-01-25 PROCEDURE — 80053 COMPREHEN METABOLIC PANEL: CPT

## 2024-01-25 PROCEDURE — 36415 COLL VENOUS BLD VENIPUNCTURE: CPT

## 2024-01-29 LAB
GAMMA INTERFERON BACKGROUND BLD IA-ACNC: 0 IU/ML
M TB IFN-G BLD-IMP: NEGATIVE
M TB IFN-G CD4+ T-CELLS BLD-ACNC: 0.03 IU/ML
M TBIFN-G CD4+ CD8+T-CELLS BLD-ACNC: 0.02 IU/ML
MITOGEN IGNF BLD-ACNC: >10 IU/ML
SERVICE CMNT-IMP: NORMAL

## 2024-06-19 ENCOUNTER — TRANSCRIBE ORDERS (OUTPATIENT)
Dept: ADMINISTRATIVE | Facility: HOSPITAL | Age: 34
End: 2024-06-19
Payer: COMMERCIAL

## 2024-06-19 DIAGNOSIS — K70.30 CIRRHOSIS, LAENNEC'S: Primary | ICD-10-CM

## 2024-06-19 DIAGNOSIS — R93.2 NONVISUALIZATION OF GALLBLADDER: ICD-10-CM

## 2024-07-11 ENCOUNTER — HOSPITAL ENCOUNTER (OUTPATIENT)
Dept: MRI IMAGING | Facility: HOSPITAL | Age: 34
Discharge: HOME | End: 2024-07-11
Admitting: PHYSICIAN ASSISTANT
Payer: COMMERCIAL

## 2024-07-11 DIAGNOSIS — K70.30 CIRRHOSIS, LAENNEC'S: ICD-10-CM

## 2024-07-11 DIAGNOSIS — R93.2 NONVISUALIZATION OF GALLBLADDER: ICD-10-CM

## 2024-07-11 PROCEDURE — 74183 MRI ABD W/O CNTR FLWD CNTR: CPT

## 2024-07-11 PROCEDURE — A9577 INJ MULTIHANCE: HCPCS | Performed by: PHYSICIAN ASSISTANT

## 2024-07-11 PROCEDURE — 0 GADOBENATE DIMEGLUMINE 529 MG/ML SOLUTION: Performed by: PHYSICIAN ASSISTANT

## 2024-07-11 RX ADMIN — GADOBENATE DIMEGLUMINE 17 ML: 529 INJECTION, SOLUTION INTRAVENOUS at 10:35

## 2024-08-14 ENCOUNTER — TRANSCRIBE ORDERS (OUTPATIENT)
Dept: ADMINISTRATIVE | Facility: HOSPITAL | Age: 34
End: 2024-08-14
Payer: COMMERCIAL

## 2024-08-14 ENCOUNTER — LAB (OUTPATIENT)
Dept: LAB | Facility: HOSPITAL | Age: 34
End: 2024-08-14
Payer: COMMERCIAL

## 2024-08-14 DIAGNOSIS — Z79.899 ENCOUNTER FOR LONG-TERM (CURRENT) USE OF OTHER MEDICATIONS: ICD-10-CM

## 2024-08-14 DIAGNOSIS — Z79.899 ENCOUNTER FOR LONG-TERM (CURRENT) USE OF OTHER MEDICATIONS: Primary | ICD-10-CM

## 2024-08-14 PROCEDURE — 36415 COLL VENOUS BLD VENIPUNCTURE: CPT

## 2024-08-14 PROCEDURE — 86480 TB TEST CELL IMMUN MEASURE: CPT

## 2024-08-16 LAB
GAMMA INTERFERON BACKGROUND BLD IA-ACNC: 0.02 IU/ML
M TB IFN-G BLD-IMP: NEGATIVE
M TB IFN-G CD4+ BCKGRND COR BLD-ACNC: 0.04 IU/ML
M TB IFN-G CD4+CD8+ BCKGRND COR BLD-ACNC: 0.04 IU/ML
MITOGEN IGNF BCKGRD COR BLD-ACNC: >10 IU/ML
QUANTIFERON INCUBATION: NORMAL
SERVICE CMNT-IMP: NORMAL

## 2024-12-23 ENCOUNTER — TRANSCRIBE ORDERS (OUTPATIENT)
Dept: ADMINISTRATIVE | Facility: HOSPITAL | Age: 34
End: 2024-12-23
Payer: COMMERCIAL

## 2024-12-23 DIAGNOSIS — K70.10 ACUTE ALCOHOLIC LIVER DISEASE: ICD-10-CM

## 2024-12-23 DIAGNOSIS — E83.119 HEMOCHROMATOSIS, UNSPECIFIED HEMOCHROMATOSIS TYPE: Primary | ICD-10-CM

## 2024-12-23 DIAGNOSIS — K74.02 HEPATIC FIBROSIS, ADVANCED FIBROSIS: ICD-10-CM

## 2025-01-13 ENCOUNTER — HOSPITAL ENCOUNTER (OUTPATIENT)
Dept: ULTRASOUND IMAGING | Facility: HOSPITAL | Age: 35
Discharge: HOME | End: 2025-01-13
Admitting: PHYSICIAN ASSISTANT
Payer: COMMERCIAL

## 2025-01-13 DIAGNOSIS — K70.10 ACUTE ALCOHOLIC LIVER DISEASE: ICD-10-CM

## 2025-01-13 DIAGNOSIS — K74.02 HEPATIC FIBROSIS, ADVANCED FIBROSIS: ICD-10-CM

## 2025-01-13 DIAGNOSIS — E83.119 HEMOCHROMATOSIS, UNSPECIFIED HEMOCHROMATOSIS TYPE: ICD-10-CM

## 2025-01-13 PROCEDURE — 76700 US EXAM ABDOM COMPLETE: CPT

## 2025-08-08 ENCOUNTER — LAB (OUTPATIENT)
Dept: LAB | Facility: HOSPITAL | Age: 35
End: 2025-08-08
Payer: COMMERCIAL

## 2025-08-08 ENCOUNTER — TRANSCRIBE ORDERS (OUTPATIENT)
Dept: ADMINISTRATIVE | Facility: HOSPITAL | Age: 35
End: 2025-08-08
Payer: COMMERCIAL

## 2025-08-08 DIAGNOSIS — Z79.899 POLYPHARMACY: ICD-10-CM

## 2025-08-08 DIAGNOSIS — Z79.899 POLYPHARMACY: Primary | ICD-10-CM

## 2025-08-08 PROCEDURE — 36415 COLL VENOUS BLD VENIPUNCTURE: CPT

## 2025-08-08 PROCEDURE — 86480 TB TEST CELL IMMUN MEASURE: CPT

## 2025-08-12 LAB
GAMMA INTERFERON BACKGROUND BLD IA-ACNC: 0.12 IU/ML
M TB IFN-G BLD-IMP: NEGATIVE
M TB IFN-G CD4+ BCKGRND COR BLD-ACNC: 0.09 IU/ML
M TB IFN-G CD4+CD8+ BCKGRND COR BLD-ACNC: 0.12 IU/ML
MITOGEN IGNF BCKGRD COR BLD-ACNC: >10 IU/ML
QUANTIFERON INCUBATION: NORMAL
SERVICE CMNT-IMP: NORMAL